# Patient Record
Sex: MALE | Race: WHITE | Employment: PART TIME | URBAN - METROPOLITAN AREA
[De-identification: names, ages, dates, MRNs, and addresses within clinical notes are randomized per-mention and may not be internally consistent; named-entity substitution may affect disease eponyms.]

---

## 2018-06-28 ENCOUNTER — APPOINTMENT (OUTPATIENT)
Dept: PHYSICAL THERAPY | Age: 73
End: 2018-06-28

## 2018-07-03 ENCOUNTER — APPOINTMENT (OUTPATIENT)
Dept: PHYSICAL THERAPY | Age: 73
End: 2018-07-03

## 2018-07-05 ENCOUNTER — APPOINTMENT (OUTPATIENT)
Dept: PHYSICAL THERAPY | Age: 73
End: 2018-07-05

## 2018-07-10 ENCOUNTER — APPOINTMENT (OUTPATIENT)
Dept: PHYSICAL THERAPY | Age: 73
End: 2018-07-10

## 2018-07-12 ENCOUNTER — APPOINTMENT (OUTPATIENT)
Dept: PHYSICAL THERAPY | Age: 73
End: 2018-07-12

## 2018-07-17 ENCOUNTER — APPOINTMENT (OUTPATIENT)
Dept: PHYSICAL THERAPY | Age: 73
End: 2018-07-17

## 2018-07-19 ENCOUNTER — APPOINTMENT (OUTPATIENT)
Dept: PHYSICAL THERAPY | Age: 73
End: 2018-07-19

## 2018-07-24 ENCOUNTER — APPOINTMENT (OUTPATIENT)
Dept: PHYSICAL THERAPY | Age: 73
End: 2018-07-24

## 2018-07-26 ENCOUNTER — APPOINTMENT (OUTPATIENT)
Dept: PHYSICAL THERAPY | Age: 73
End: 2018-07-26

## 2018-08-02 ENCOUNTER — HOSPITAL ENCOUNTER (OUTPATIENT)
Dept: LAB | Age: 73
Discharge: HOME | End: 2018-08-02
Payer: MEDICARE

## 2018-08-02 DIAGNOSIS — D72.9 NEUTROPHILIA: ICD-10-CM

## 2018-08-02 DIAGNOSIS — N42.9 DISORDER OF PROSTATE: ICD-10-CM

## 2018-08-02 DIAGNOSIS — R63.4 WEIGHT LOSS: ICD-10-CM

## 2018-08-02 DIAGNOSIS — R63.8 OTHER SYMPTOMS AND SIGNS CONCERNING FOOD AND FLUID INTAKE: ICD-10-CM

## 2018-08-02 LAB
ALBUMIN SERPL-MCNC: 4 G/DL (ref 3.2–4.6)
ALBUMIN/GLOB SERPL: 1.1 {RATIO} (ref 1.2–3.5)
ALP SERPL-CCNC: 39 U/L (ref 50–136)
ALT SERPL-CCNC: 37 U/L (ref 12–65)
ANION GAP SERPL CALC-SCNC: 9 MMOL/L (ref 7–16)
AST SERPL-CCNC: 15 U/L (ref 15–37)
BASOPHILS # BLD: 0.1 K/UL (ref 0–0.2)
BASOPHILS NFR BLD: 1 % (ref 0–2)
BILIRUB SERPL-MCNC: 0.4 MG/DL (ref 0.2–1.1)
BUN SERPL-MCNC: 10 MG/DL (ref 8–23)
CALCIUM SERPL-MCNC: 9 MG/DL (ref 8.3–10.4)
CHLORIDE SERPL-SCNC: 100 MMOL/L (ref 98–107)
CO2 SERPL-SCNC: 27 MMOL/L (ref 21–32)
CREAT SERPL-MCNC: 1.05 MG/DL (ref 0.8–1.5)
CRP SERPL-MCNC: <0.3 MG/DL (ref 0–0.9)
DIFFERENTIAL METHOD BLD: ABNORMAL
EOSINOPHIL # BLD: 0.2 K/UL (ref 0–0.8)
EOSINOPHIL NFR BLD: 1 % (ref 0.5–7.8)
ERYTHROCYTE [DISTWIDTH] IN BLOOD BY AUTOMATED COUNT: 19.1 % (ref 11.9–14.6)
ERYTHROCYTE [SEDIMENTATION RATE] IN BLOOD: 6 MM/HR (ref 0–20)
FERRITIN SERPL-MCNC: 8 NG/ML (ref 8–388)
GLOBULIN SER CALC-MCNC: 3.8 G/DL (ref 2.3–3.5)
GLUCOSE SERPL-MCNC: 128 MG/DL (ref 65–100)
HCT VFR BLD AUTO: 42.3 % (ref 41.1–50.3)
HGB BLD-MCNC: 12.4 G/DL (ref 13.6–17.2)
HGB RETIC QN AUTO: 24 PG (ref 29–35)
IMM RETICS NFR: 31 % (ref 2.3–13.4)
IRON SATN MFR SERPL: 3 %
IRON SERPL-MCNC: 15 UG/DL (ref 35–150)
LYMPHOCYTES # BLD: 2.9 K/UL (ref 0.5–4.6)
LYMPHOCYTES NFR BLD: 22 % (ref 13–44)
Lab: NORMAL
MCH RBC QN AUTO: 21.5 PG (ref 26.1–32.9)
MCHC RBC AUTO-ENTMCNC: 29.3 G/DL (ref 31.4–35)
MCV RBC AUTO: 73.3 FL (ref 79.6–97.8)
MONOCYTES # BLD: 0.8 K/UL (ref 0.1–1.3)
MONOCYTES NFR BLD: 6 % (ref 4–12)
NEUTS SEG # BLD: 9.5 K/UL (ref 1.7–8.2)
NEUTS SEG NFR BLD: 71 % (ref 43–78)
NRBC # BLD: 0.01 K/UL (ref 0–0.2)
NRBC BLD-RTO: 0.1 PER 100 WBC (ref 0–2)
PLATELET # BLD AUTO: 282 K/UL (ref 150–450)
PMV BLD AUTO: 9.3 FL (ref 10.8–14.1)
POTASSIUM SERPL-SCNC: 4.2 MMOL/L (ref 3.5–5.1)
PROT SERPL-MCNC: 7.8 G/DL (ref 6.3–8.2)
PSA SERPL-MCNC: 1 NG/ML
RBC # BLD AUTO: 5.77 M/UL (ref 4.23–5.67)
REFERENCE LAB,REFLB: NORMAL
RETICS # AUTO: 0.11 M/UL (ref 0.03–0.1)
RETICS/RBC NFR AUTO: 2 % (ref 0.3–2)
SODIUM SERPL-SCNC: 136 MMOL/L (ref 136–145)
TEST DESCRIPTION:,ATST: NORMAL
TIBC SERPL-MCNC: 437 UG/DL (ref 250–450)
WBC # BLD AUTO: 13.3 K/UL (ref 4.3–11.1)

## 2018-08-02 PROCEDURE — 88184 FLOWCYTOMETRY/ TC 1 MARKER: CPT

## 2018-08-02 PROCEDURE — 85025 COMPLETE CBC W/AUTO DIFF WBC: CPT

## 2018-08-02 PROCEDURE — 85652 RBC SED RATE AUTOMATED: CPT

## 2018-08-02 PROCEDURE — 84238 ASSAY NONENDOCRINE RECEPTOR: CPT

## 2018-08-02 PROCEDURE — 88185 FLOWCYTOMETRY/TC ADD-ON: CPT

## 2018-08-02 PROCEDURE — 83540 ASSAY OF IRON: CPT

## 2018-08-02 PROCEDURE — 86140 C-REACTIVE PROTEIN: CPT

## 2018-08-02 PROCEDURE — 82728 ASSAY OF FERRITIN: CPT

## 2018-08-02 PROCEDURE — 36415 COLL VENOUS BLD VENIPUNCTURE: CPT

## 2018-08-02 PROCEDURE — 80053 COMPREHEN METABOLIC PANEL: CPT

## 2018-08-02 PROCEDURE — 84153 ASSAY OF PSA TOTAL: CPT

## 2018-08-02 PROCEDURE — 85046 RETICYTE/HGB CONCENTRATE: CPT

## 2018-08-03 LAB — TRANSFERRIN SERPL-SCNC: 80.3 NMOL/L (ref 12.2–27.3)

## 2018-08-06 LAB — PATH REV BLD -IMP: NORMAL

## 2018-08-10 PROBLEM — D50.9 IRON DEFICIENCY ANEMIA: Status: ACTIVE | Noted: 2018-08-10

## 2018-08-10 RX ORDER — ONDANSETRON 2 MG/ML
8 INJECTION INTRAMUSCULAR; INTRAVENOUS AS NEEDED
Status: CANCELLED | OUTPATIENT
Start: 2018-08-11

## 2018-08-10 RX ORDER — EPINEPHRINE 1 MG/ML
0.3 INJECTION, SOLUTION, CONCENTRATE INTRAVENOUS AS NEEDED
Status: CANCELLED | OUTPATIENT
Start: 2018-08-11

## 2018-08-10 RX ORDER — ONDANSETRON 2 MG/ML
8 INJECTION INTRAMUSCULAR; INTRAVENOUS AS NEEDED
Status: CANCELLED | OUTPATIENT
Start: 2018-08-18

## 2018-08-10 RX ORDER — EPINEPHRINE 1 MG/ML
0.3 INJECTION, SOLUTION, CONCENTRATE INTRAVENOUS AS NEEDED
Status: CANCELLED | OUTPATIENT
Start: 2018-08-18

## 2018-08-10 RX ORDER — HYDROCORTISONE SODIUM SUCCINATE 100 MG/2ML
100 INJECTION, POWDER, FOR SOLUTION INTRAMUSCULAR; INTRAVENOUS AS NEEDED
Status: CANCELLED | OUTPATIENT
Start: 2018-08-11

## 2018-08-10 RX ORDER — HYDROCORTISONE SODIUM SUCCINATE 100 MG/2ML
100 INJECTION, POWDER, FOR SOLUTION INTRAMUSCULAR; INTRAVENOUS AS NEEDED
Status: CANCELLED | OUTPATIENT
Start: 2018-08-18

## 2018-08-10 RX ORDER — SODIUM CHLORIDE 0.9 % (FLUSH) 0.9 %
10 SYRINGE (ML) INJECTION AS NEEDED
Status: CANCELLED
Start: 2018-08-18

## 2018-08-10 RX ORDER — DIPHENHYDRAMINE HYDROCHLORIDE 50 MG/ML
50 INJECTION, SOLUTION INTRAMUSCULAR; INTRAVENOUS AS NEEDED
Status: CANCELLED
Start: 2018-08-11

## 2018-08-10 RX ORDER — DIPHENHYDRAMINE HYDROCHLORIDE 50 MG/ML
50 INJECTION, SOLUTION INTRAMUSCULAR; INTRAVENOUS AS NEEDED
Status: CANCELLED
Start: 2018-08-18

## 2018-08-10 RX ORDER — ACETAMINOPHEN 325 MG/1
650 TABLET ORAL AS NEEDED
Status: CANCELLED
Start: 2018-08-18

## 2018-08-10 RX ORDER — HEPARIN 100 UNIT/ML
300-500 SYRINGE INTRAVENOUS AS NEEDED
Status: CANCELLED
Start: 2018-08-18

## 2018-08-10 RX ORDER — SODIUM CHLORIDE 9 MG/ML
10 INJECTION INTRAMUSCULAR; INTRAVENOUS; SUBCUTANEOUS AS NEEDED
Status: CANCELLED | OUTPATIENT
Start: 2018-08-18

## 2018-08-10 RX ORDER — ACETAMINOPHEN 325 MG/1
650 TABLET ORAL AS NEEDED
Status: CANCELLED
Start: 2018-08-11

## 2018-08-10 RX ORDER — SODIUM CHLORIDE 9 MG/ML
10 INJECTION INTRAMUSCULAR; INTRAVENOUS; SUBCUTANEOUS AS NEEDED
Status: CANCELLED | OUTPATIENT
Start: 2018-08-11

## 2018-08-10 RX ORDER — ALBUTEROL SULFATE 0.83 MG/ML
2.5 SOLUTION RESPIRATORY (INHALATION) AS NEEDED
Status: CANCELLED
Start: 2018-08-18

## 2018-08-10 RX ORDER — ALBUTEROL SULFATE 0.83 MG/ML
2.5 SOLUTION RESPIRATORY (INHALATION) AS NEEDED
Status: CANCELLED
Start: 2018-08-11

## 2018-08-11 ENCOUNTER — HOSPITAL ENCOUNTER (OUTPATIENT)
Dept: INFUSION THERAPY | Age: 73
Discharge: HOME | End: 2018-08-11
Payer: MEDICARE

## 2018-08-11 VITALS
OXYGEN SATURATION: 97 % | HEART RATE: 94 BPM | TEMPERATURE: 98.6 F | DIASTOLIC BLOOD PRESSURE: 83 MMHG | RESPIRATION RATE: 20 BRPM | WEIGHT: 246 LBS | BODY MASS INDEX: 33.36 KG/M2 | SYSTOLIC BLOOD PRESSURE: 146 MMHG

## 2018-08-11 DIAGNOSIS — D50.9 IRON DEFICIENCY ANEMIA, UNSPECIFIED IRON DEFICIENCY ANEMIA TYPE: ICD-10-CM

## 2018-08-11 PROCEDURE — 74011250636 HC RX REV CODE- 250/636: Performed by: NURSE PRACTITIONER

## 2018-08-11 PROCEDURE — 96361 HYDRATE IV INFUSION ADD-ON: CPT

## 2018-08-11 PROCEDURE — 96365 THER/PROPH/DIAG IV INF INIT: CPT

## 2018-08-11 RX ORDER — SODIUM CHLORIDE 0.9 % (FLUSH) 0.9 %
10 SYRINGE (ML) INJECTION AS NEEDED
Status: ACTIVE | OUTPATIENT
Start: 2018-08-11 | End: 2018-08-12

## 2018-08-11 RX ORDER — HEPARIN 100 UNIT/ML
300-500 SYRINGE INTRAVENOUS AS NEEDED
Status: ACTIVE | OUTPATIENT
Start: 2018-08-11 | End: 2018-08-12

## 2018-08-11 RX ADMIN — SODIUM CHLORIDE 500 ML: 900 INJECTION, SOLUTION INTRAVENOUS at 13:30

## 2018-08-11 RX ADMIN — FERRIC CARBOXYMALTOSE INJECTION 750 MG: 50 INJECTION, SOLUTION INTRAVENOUS at 14:05

## 2018-08-11 NOTE — PROGRESS NOTES
Pt arrived ambulatory today at [phone], to have IV injectafer. Pt tolerated without difficulty. Patient discharged via ambulatory accompanied by ambulatory. Instructed to notify physician of any problems, questions or concerns. Allowed opportunity for patient/family to ask questions. Verbalized understanding. Next appointment is August 18 at 1300 with [facility].

## 2018-08-11 NOTE — PROGRESS NOTES
Problem: Knowledge Deficit  Goal: *Verbalizes understanding of procedures and medications  Outcome: Progressing Towards Goal  Verbalizes/demonstrates understanding of purpose/procedure/potentia side effects of injectafer.

## 2018-08-16 ENCOUNTER — APPOINTMENT (OUTPATIENT)
Dept: INFUSION THERAPY | Age: 73
End: 2018-08-16

## 2018-08-18 ENCOUNTER — HOSPITAL ENCOUNTER (OUTPATIENT)
Dept: INFUSION THERAPY | Age: 73
Discharge: HOME | End: 2018-08-18
Payer: MEDICARE

## 2018-08-18 VITALS
OXYGEN SATURATION: 96 % | WEIGHT: 246 LBS | DIASTOLIC BLOOD PRESSURE: 81 MMHG | TEMPERATURE: 97.9 F | HEART RATE: 88 BPM | RESPIRATION RATE: 20 BRPM | BODY MASS INDEX: 33.36 KG/M2 | SYSTOLIC BLOOD PRESSURE: 158 MMHG

## 2018-08-18 DIAGNOSIS — D50.9 IRON DEFICIENCY ANEMIA, UNSPECIFIED IRON DEFICIENCY ANEMIA TYPE: ICD-10-CM

## 2018-08-18 PROCEDURE — 74011250636 HC RX REV CODE- 250/636: Performed by: NURSE PRACTITIONER

## 2018-08-18 PROCEDURE — 96365 THER/PROPH/DIAG IV INF INIT: CPT

## 2018-08-18 PROCEDURE — 96361 HYDRATE IV INFUSION ADD-ON: CPT

## 2018-08-18 RX ORDER — SODIUM CHLORIDE 0.9 % (FLUSH) 0.9 %
10 SYRINGE (ML) INJECTION AS NEEDED
Status: ACTIVE | OUTPATIENT
Start: 2018-08-18 | End: 2018-08-19

## 2018-08-18 RX ADMIN — SODIUM CHLORIDE 500 ML: 900 INJECTION, SOLUTION INTRAVENOUS at 13:20

## 2018-08-18 RX ADMIN — FERRIC CARBOXYMALTOSE INJECTION 750 MG: 50 INJECTION, SOLUTION INTRAVENOUS at 13:34

## 2018-08-18 NOTE — PROGRESS NOTES
Pt arrived ambulatory today at [phone], to have IV injectafer. Pt tolerated without difficulty. Patient discharged via ambulatory accompanied by ambulatory. Instructed to notify physician of any problems, questions or concerns. Allowed opportunity for patient/family to ask questions. Verbalized understanding. Next appointment is September 6 at [address] with Dr. Blanca Mcdonough.

## 2018-08-23 ENCOUNTER — APPOINTMENT (OUTPATIENT)
Dept: INFUSION THERAPY | Age: 73
End: 2018-08-23

## 2018-08-30 PROBLEM — R68.81 EARLY SATIETY: Status: ACTIVE | Noted: 2018-08-30

## 2018-09-06 ENCOUNTER — HOSPITAL ENCOUNTER (OUTPATIENT)
Dept: LAB | Age: 73
Discharge: HOME | End: 2018-09-06
Payer: MEDICARE

## 2018-09-06 DIAGNOSIS — D50.9 IRON DEFICIENCY ANEMIA, UNSPECIFIED IRON DEFICIENCY ANEMIA TYPE: ICD-10-CM

## 2018-09-06 LAB
ALBUMIN SERPL-MCNC: 4 G/DL (ref 3.2–4.6)
ALBUMIN/GLOB SERPL: 1 {RATIO} (ref 1.2–3.5)
ALP SERPL-CCNC: 44 U/L (ref 50–136)
ALT SERPL-CCNC: 33 U/L (ref 12–65)
ANION GAP SERPL CALC-SCNC: 6 MMOL/L (ref 7–16)
AST SERPL-CCNC: 21 U/L (ref 15–37)
BASOPHILS # BLD: 0.1 K/UL (ref 0–0.2)
BASOPHILS NFR BLD: 1 % (ref 0–2)
BILIRUB SERPL-MCNC: 0.3 MG/DL (ref 0.2–1.1)
BUN SERPL-MCNC: 9 MG/DL (ref 8–23)
CALCIUM SERPL-MCNC: 8.6 MG/DL (ref 8.3–10.4)
CHLORIDE SERPL-SCNC: 104 MMOL/L (ref 98–107)
CO2 SERPL-SCNC: 28 MMOL/L (ref 21–32)
CREAT SERPL-MCNC: 1.06 MG/DL (ref 0.8–1.5)
DIFFERENTIAL METHOD BLD: ABNORMAL
EOSINOPHIL # BLD: 0.2 K/UL (ref 0–0.8)
EOSINOPHIL NFR BLD: 2 % (ref 0.5–7.8)
ERYTHROCYTE [DISTWIDTH] IN BLOOD BY AUTOMATED COUNT: 27.1 % (ref 11.9–14.6)
FERRITIN SERPL-MCNC: 122 NG/ML (ref 8–388)
GLOBULIN SER CALC-MCNC: 3.9 G/DL (ref 2.3–3.5)
GLUCOSE SERPL-MCNC: 162 MG/DL (ref 65–100)
HCT VFR BLD AUTO: 52.2 % (ref 41.1–50.3)
HGB BLD-MCNC: 15.2 G/DL (ref 13.6–17.2)
HGB RETIC QN AUTO: 31 PG (ref 29–35)
IMM GRANULOCYTES # BLD: 0.1 K/UL (ref 0–0.5)
IMM GRANULOCYTES NFR BLD AUTO: 1 % (ref 0–5)
IMM RETICS NFR: 27.5 % (ref 2.3–13.4)
IRON SATN MFR SERPL: 9 %
IRON SERPL-MCNC: 33 UG/DL (ref 35–150)
LYMPHOCYTES # BLD: 2.6 K/UL (ref 0.5–4.6)
LYMPHOCYTES NFR BLD: 28 % (ref 13–44)
MCH RBC QN AUTO: 24.6 PG (ref 26.1–32.9)
MCHC RBC AUTO-ENTMCNC: 29.1 G/DL (ref 31.4–35)
MCV RBC AUTO: 84.3 FL (ref 79.6–97.8)
MONOCYTES # BLD: 0.7 K/UL (ref 0.1–1.3)
MONOCYTES NFR BLD: 8 % (ref 4–12)
NEUTS SEG # BLD: 5.6 K/UL (ref 1.7–8.2)
NEUTS SEG NFR BLD: 61 % (ref 43–78)
NRBC # BLD: 0 K/UL (ref 0–0.2)
PLATELET # BLD AUTO: 251 K/UL (ref 150–450)
PMV BLD AUTO: 8.7 FL (ref 9.4–12.3)
POTASSIUM SERPL-SCNC: 4.9 MMOL/L (ref 3.5–5.1)
PROT SERPL-MCNC: 7.9 G/DL (ref 6.3–8.2)
RBC # BLD AUTO: 6.19 M/UL (ref 4.23–5.67)
RETICS # AUTO: 0.16 M/UL (ref 0.03–0.1)
RETICS/RBC NFR AUTO: 2.5 % (ref 0.3–2)
SODIUM SERPL-SCNC: 138 MMOL/L (ref 136–145)
TIBC SERPL-MCNC: 366 UG/DL (ref 250–450)
WBC # BLD AUTO: 9.2 K/UL (ref 4.3–11.1)

## 2018-09-06 PROCEDURE — 85025 COMPLETE CBC W/AUTO DIFF WBC: CPT

## 2018-09-06 PROCEDURE — 85046 RETICYTE/HGB CONCENTRATE: CPT

## 2018-09-06 PROCEDURE — 36415 COLL VENOUS BLD VENIPUNCTURE: CPT

## 2018-09-06 PROCEDURE — 83540 ASSAY OF IRON: CPT

## 2018-09-06 PROCEDURE — 82728 ASSAY OF FERRITIN: CPT

## 2018-09-06 PROCEDURE — 80053 COMPREHEN METABOLIC PANEL: CPT

## 2018-09-08 ENCOUNTER — HOSPITAL ENCOUNTER (OUTPATIENT)
Dept: INFUSION THERAPY | Age: 73
Discharge: HOME | End: 2018-09-08
Payer: MEDICARE

## 2018-09-08 VITALS
WEIGHT: 245.99 LBS | OXYGEN SATURATION: 97 % | SYSTOLIC BLOOD PRESSURE: 135 MMHG | TEMPERATURE: 97.9 F | DIASTOLIC BLOOD PRESSURE: 70 MMHG | BODY MASS INDEX: 33.36 KG/M2 | HEART RATE: 89 BPM | RESPIRATION RATE: 20 BRPM

## 2018-09-08 DIAGNOSIS — D50.9 IRON DEFICIENCY ANEMIA, UNSPECIFIED IRON DEFICIENCY ANEMIA TYPE: ICD-10-CM

## 2018-09-08 PROCEDURE — 96365 THER/PROPH/DIAG IV INF INIT: CPT

## 2018-09-08 PROCEDURE — 74011250636 HC RX REV CODE- 250/636: Performed by: INTERNAL MEDICINE

## 2018-09-08 PROCEDURE — 96361 HYDRATE IV INFUSION ADD-ON: CPT

## 2018-09-08 RX ORDER — SODIUM CHLORIDE 0.9 % (FLUSH) 0.9 %
10 SYRINGE (ML) INJECTION AS NEEDED
Status: ACTIVE | OUTPATIENT
Start: 2018-09-08 | End: 2018-09-08

## 2018-09-08 RX ORDER — HEPARIN 100 UNIT/ML
300-500 SYRINGE INTRAVENOUS AS NEEDED
Status: ACTIVE | OUTPATIENT
Start: 2018-09-08 | End: 2018-09-08

## 2018-09-08 RX ADMIN — SODIUM CHLORIDE 500 ML: 900 INJECTION, SOLUTION INTRAVENOUS at 10:55

## 2018-09-08 RX ADMIN — FERRIC CARBOXYMALTOSE INJECTION 750 MG: 50 INJECTION, SOLUTION INTRAVENOUS at 11:04

## 2018-09-08 NOTE — PROGRESS NOTES
Pt arrived ambulatory today at [phone], to have IV injectafer. Pt tolerated without difficulty. Patient discharged via ambulatory accompanied by ambulatory. Instructed to notify physician of any problems, questions or concerns. Allowed opportunity for patient/family to ask questions. Verbalized understanding. Next appointment is Sammy 10 at . Jayda Esparza [address] with Dr. Gabriele Arnold.

## 2018-09-25 ENCOUNTER — ANESTHESIA EVENT (OUTPATIENT)
Dept: ENDOSCOPY | Age: 73
End: 2018-09-25
Payer: MEDICARE

## 2018-09-25 ENCOUNTER — ANESTHESIA (OUTPATIENT)
Dept: ENDOSCOPY | Age: 73
End: 2018-09-25
Payer: MEDICARE

## 2018-09-25 ENCOUNTER — HOSPITAL ENCOUNTER (OUTPATIENT)
Age: 73
Setting detail: OUTPATIENT SURGERY
Discharge: HOME | End: 2018-09-25
Attending: SURGERY | Admitting: SURGERY
Payer: MEDICARE

## 2018-09-25 VITALS
BODY MASS INDEX: 33.86 KG/M2 | RESPIRATION RATE: 18 BRPM | SYSTOLIC BLOOD PRESSURE: 170 MMHG | OXYGEN SATURATION: 95 % | WEIGHT: 250 LBS | HEART RATE: 86 BPM | HEIGHT: 72 IN | TEMPERATURE: 98.6 F | DIASTOLIC BLOOD PRESSURE: 88 MMHG

## 2018-09-25 LAB — GLUCOSE BLD STRIP.AUTO-MCNC: 169 MG/DL (ref 65–100)

## 2018-09-25 PROCEDURE — 82962 GLUCOSE BLOOD TEST: CPT

## 2018-09-25 PROCEDURE — 76040000026: Performed by: SURGERY

## 2018-09-25 PROCEDURE — 76060000032 HC ANESTHESIA 0.5 TO 1 HR: Performed by: SURGERY

## 2018-09-25 PROCEDURE — 77030009426 HC FCPS BIOP ENDOSC BSC -B: Performed by: SURGERY

## 2018-09-25 PROCEDURE — 74011250636 HC RX REV CODE- 250/636

## 2018-09-25 PROCEDURE — 74011250636 HC RX REV CODE- 250/636: Performed by: ANESTHESIOLOGY

## 2018-09-25 PROCEDURE — 88305 TISSUE EXAM BY PATHOLOGIST: CPT

## 2018-09-25 RX ORDER — SODIUM CHLORIDE 0.9 % (FLUSH) 0.9 %
5-10 SYRINGE (ML) INJECTION AS NEEDED
Status: CANCELLED | OUTPATIENT
Start: 2018-09-25

## 2018-09-25 RX ORDER — SODIUM CHLORIDE, SODIUM LACTATE, POTASSIUM CHLORIDE, CALCIUM CHLORIDE 600; 310; 30; 20 MG/100ML; MG/100ML; MG/100ML; MG/100ML
100 INJECTION, SOLUTION INTRAVENOUS CONTINUOUS
Status: DISCONTINUED | OUTPATIENT
Start: 2018-09-25 | End: 2018-09-25 | Stop reason: HOSPADM

## 2018-09-25 RX ORDER — LIDOCAINE HYDROCHLORIDE 20 MG/ML
INJECTION, SOLUTION EPIDURAL; INFILTRATION; INTRACAUDAL; PERINEURAL AS NEEDED
Status: DISCONTINUED | OUTPATIENT
Start: 2018-09-25 | End: 2018-09-25 | Stop reason: HOSPADM

## 2018-09-25 RX ORDER — PROPOFOL 10 MG/ML
INJECTION, EMULSION INTRAVENOUS
Status: DISCONTINUED | OUTPATIENT
Start: 2018-09-25 | End: 2018-09-25 | Stop reason: HOSPADM

## 2018-09-25 RX ORDER — SODIUM CHLORIDE, SODIUM LACTATE, POTASSIUM CHLORIDE, CALCIUM CHLORIDE 600; 310; 30; 20 MG/100ML; MG/100ML; MG/100ML; MG/100ML
100 INJECTION, SOLUTION INTRAVENOUS CONTINUOUS
Status: CANCELLED | OUTPATIENT
Start: 2018-09-25

## 2018-09-25 RX ORDER — PROPOFOL 10 MG/ML
INJECTION, EMULSION INTRAVENOUS AS NEEDED
Status: DISCONTINUED | OUTPATIENT
Start: 2018-09-25 | End: 2018-09-25 | Stop reason: HOSPADM

## 2018-09-25 RX ADMIN — PROPOFOL 250 MCG/KG/MIN: 10 INJECTION, EMULSION INTRAVENOUS at 07:00

## 2018-09-25 RX ADMIN — LIDOCAINE HYDROCHLORIDE 100 MG: 20 INJECTION, SOLUTION EPIDURAL; INFILTRATION; INTRACAUDAL; PERINEURAL at 07:00

## 2018-09-25 RX ADMIN — SODIUM CHLORIDE, SODIUM LACTATE, POTASSIUM CHLORIDE, AND CALCIUM CHLORIDE 100 ML/HR: 600; 310; 30; 20 INJECTION, SOLUTION INTRAVENOUS at 06:50

## 2018-09-25 RX ADMIN — PROPOFOL 50 MG: 10 INJECTION, EMULSION INTRAVENOUS at 07:00

## 2018-09-25 NOTE — ANESTHESIA POSTPROCEDURE EVALUATION
Post-Anesthesia Evaluation and Assessment Patient: Gigi Contreras MRN: [id number]  SSN: [id number] YOB: 1945  Age: 75 y.o. Sex: male Cardiovascular Function/Vital Signs Visit Vitals  /75  Pulse 86  Temp 37 °C (98.6 °F)  Resp 18  Ht 6' (1.829 m)  Wt 113.4 kg (250 lb)  SpO2 90%  BMI 33.91 kg/m2 Patient is status post total IV anesthesia anesthesia for Procedure(s): ESOPHAGOGASTRODUODENOSCOPY (EGD) COLONOSCOPY  BMI 34 ENDOSCOPIC POLYPECTOMY. Nausea/Vomiting: None Postoperative hydration reviewed and adequate. Pain: 
Pain Scale 1: Numeric (0 - 10) (09/25/18 0745) Pain Intensity 1: 0 (09/25/18 0745) Managed Neurological Status: At baseline Mental Status and Level of Consciousness: Arousable Pulmonary Status:  
O2 Device: Nasal cannula (09/25/18 0745) Adequate oxygenation and airway patent Complications related to anesthesia: None Post-anesthesia assessment completed. No concerns Signed By: Janine Oh MD   
 September 25, 2018

## 2018-09-25 NOTE — DISCHARGE INSTRUCTIONS
Gastrointestinal Esophagogastroduodenoscopy (EGD) - Upper Exam Discharge Instructions    1. Call Dr. Shruti Ochoa for any problems or questions. 2. Contact the doctor's office for follow up appointment as directed. 3. Medication may cause drowsiness for several hours, therefore, do not drive or operate machinery for remainder of the day. 4. No alcohol today. 5. Ordinarily, you may resume regular diet and activity after exam unless otherwise specified by your physician. 6. For mild soreness in your throat you may use Cepacol throat lozenges or warm salt-water gargles as needed. Instructions given to [facility] and other family members. Gastrointestinal Colonoscopy/Flexible Sigmoidoscopy - Lower Exam Discharge Instructions  1. Call Dr. Shruti Ochoa for any problems or questions. 2. Contact the doctors office for follow up appointment as directed  3. Medication may cause drowsiness for several hours, therefore, do not drive or operate machinery for remainder of the day. 4. No alcohol today. 5. Ordinarily, you may resume regular diet and activity after exam unless otherwise specified by your physician. 6. Because of air put into your colon during exam, you may experience some abdominal distension, relieved by the passage of gas, for several hours. 7. Contact your physician if you have any of the following:  a. Excessive amount of bleeding - large amount when having a bowel movement. Occasional specks of blood with bowel movement would not be unusual.  b. Severe abdominal pain  c. Fever or Chills  8. Polyp Removal - follow these additional instructions  a. No Aspirin, Advil, Aleve, Nuprin, Ibuprofen, or medications that contain these drugs for 2 weeks. Any additional instructions: Follow up with pathology      Instructions given to [facility] and other family members.

## 2018-09-25 NOTE — IP AVS SNAPSHOT
[address] 
[phone] Patient: Lo Oh MRN: [id number] AHB:[birth year] About your hospitalization You were admitted on:  September 25, 2018 You last received care in the:  [facility] You were discharged on:  September 25, 2018 Why you were hospitalized Your primary diagnosis was:  Not on File Follow-up Information None Discharge Orders None A check leonardo indicates which time of day the medication should be taken. My Medications ASK your doctor about these medications Instructions Each Dose to Equal  
 Morning Noon Evening Bedtime  
 amLODIPine 5 mg tablet Commonly known as:  Zaina Quintanilla Your last dose was: Your next dose is: Take 5 mg by mouth every morning. 5 mg  
    
   
   
   
  
 aspirin delayed-release 81 mg tablet Your last dose was: Your next dose is: Take 81 mg by mouth daily. Not currently taking-9-23-18  
 81 mg  
    
   
   
   
  
 b complex vitamins tablet Your last dose was: Your next dose is: Take 1 Tab by mouth daily. 1 Tab  
    
   
   
   
  
 benazepril 5 mg tablet Commonly known as:  LOTENSIN Your last dose was: Your next dose is: Take 5 mg by mouth daily. 5 mg CRESTOR 10 mg tablet Generic drug:  rosuvastatin Your last dose was: Your next dose is: Take 10 mg by mouth nightly. 10 mg  
    
   
   
   
  
 fenofibrate 160 mg tablet Commonly known as:  LOFIBRA Your last dose was: Your next dose is: Take 160 mg by mouth nightly. 160 mg  
    
   
   
   
  
 irbesartan 300 mg tablet Commonly known as:  AVAPRO Your last dose was: Your next dose is: Take 300 mg by mouth every morning.   
 300 mg  
    
   
   
   
  
 metFORMIN 500 mg tablet Commonly known as:  GLUCOPHAGE Your last dose was: Your next dose is: Take 500 mg by mouth nightly. 500 mg MULTIVITAMIN PO Your last dose was: Your next dose is: Take 1 Tab by mouth daily. 1 Tab  
    
   
   
   
  
 testosterone cypionate 200 mg/mL injection Commonly known as:  DEPOTESTOTERONE CYPIONATE Your last dose was: Your next dose is:    
   
   
 200 mg by IntraMUSCular route every fourteen (14) days. 200 mg  
    
   
   
   
  
 VITAMIN D3 2,000 unit Tab Generic drug:  cholecalciferol (vitamin D3) Your last dose was: Your next dose is: Take 1 Tab by mouth two (2) times a day. 1 Tab Discharge Instructions Gastrointestinal Esophagogastroduodenoscopy (EGD) - Upper Exam Discharge Instructions 1. Call Dr. Elsie White for any problems or questions. 2. Contact the doctor's office for follow up appointment as directed. 3. Medication may cause drowsiness for several hours, therefore, do not drive or operate machinery for remainder of the day. 4. No alcohol today. 5. Ordinarily, you may resume regular diet and activity after exam unless otherwise specified by your physician. 6. For mild soreness in your throat you may use Cepacol throat lozenges or warm salt-water gargles as needed. Instructions given to Carmita Cornejo and other family members. Gastrointestinal Colonoscopy/Flexible Sigmoidoscopy - Lower Exam Discharge Instructions 1. Call Dr. Elsie White for any problems or questions. 2. Contact the doctors office for follow up appointment as directed 3. Medication may cause drowsiness for several hours, therefore, do not drive or operate machinery for remainder of the day. 4. No alcohol today. 5. Ordinarily, you may resume regular diet and activity after exam unless otherwise specified by your physician. 6. Because of air put into your colon during exam, you may experience some abdominal distension, relieved by the passage of gas, for several hours. 7. Contact your physician if you have any of the following: 
a. Excessive amount of bleeding  large amount when having a bowel movement. Occasional specks of blood with bowel movement would not be unusual. 
b. Severe abdominal pain 
c. Fever or Chills 8. Polyp Removal  follow these additional instructions 
a. No Aspirin, Advil, Aleve, Nuprin, Ibuprofen, or medications that contain these drugs for 2 weeks. Any additional instructions: Follow up with pathology Instructions given to Margaux Khanna and other family members. Introducing [facility] HE[facility]! [employer] introduces Savorfull patient portal. Now you can access parts of your medical record, email your doctor's office, and request medication refills online. 1. In your internet browser, go to [email or web address] 2. Click on the First Time User? Click Here link in the Sign In box. You will see the New Member Sign Up page. 3. Enter your Savorfull Access Code exactly as it appears below. You will not need to use this code after youve completed the sign-up process. If you do not sign up before the expiration date, you must request a new code. · Savorfull Access Code: [email or web address] Expires: 10/15/2018  3:57 PM 
 
4. Enter the last four digits of your Social Security Number (xxxx) and Date of Birth (mm/dd/yyyy) as indicated and click Submit. You will be taken to the next sign-up page. 5. Create a Obsorbt ID. This will be your Savorfull login ID and cannot be changed, so think of one that is secure and easy to remember. 6. Create a Savorfull password. You can change your password at any time. 7. Enter your Password Reset Question and Answer. This can be used at a later time if you forget your password. 8. Enter your e-mail address. You will receive e-mail notification when new information is available in [address] 9. Click Sign Up. You can now view and download portions of your medical record. 10. Click the Download Summary menu link to download a portable copy of your medical information. If you have questions, please visit the Frequently Asked Questions section of the RingCentral[employer] website. Remember, [employer] is NOT to be used for urgent needs. For medical emergencies, dial 911. Now available from your iPhone and Android! Introducing Dennis Bob As a [employer] patient, I wanted to make you aware of our electronic visit tool called Dennis Bob. [employer] 24/7 allows you to connect within minutes with a medical provider 24 hours a day, seven days a week via a mobile device or tablet or logging into a secure website from your computer. You can access Dennis Bob from anywhere in the [place]. A virtual visit might be right for you when you have a simple condition and feel like you just dont want to get out of bed, or cant get away from work for an appointment, when your regular [employer] provider is not available (evenings, weekends or holidays), or when youre out of town and need minor care. Electronic visits cost only $49 and if the [employer] 24/7 provider determines a prescription is needed to treat your condition, one can be electronically transmitted to a nearby pharmacy*. Please take a moment to enroll today if you have not already done so. The enrollment process is free and takes just a few minutes. To enroll, please download the [employer] 24/7 caesar to your tablet or phone, or visit [email or web address] to enroll on your computer.    
And, as an [address] [place] [address] patient with a [employer] account, the results of your visits will be scanned into your electronic medical record and your primary care provider will be able to view the scanned results. We urge you to continue to see your regular Josephine Bustos provider for your ongoing medical care. And while your primary care provider may not be the one available when you seek a Dennis Bob virtual visit, the peace of mind you get from getting a real diagnosis real time can be priceless. For more information on Dennis Ezradavid, view our Frequently Asked Questions (FAQs) at [email or web address]. Sincerely, 
 
James Acosta MD 
Chief Medical Officer [address] *:  certain medications cannot be prescribed via Dennis Bob Providers Seen During Your Hospitalization Provider Specialty Primary office phone Gabby Barnes MD General Surgery [phone] Your Primary Care Physician (PCP) Primary Care Physician Office Phone Office Fax Skyler Chan [address]at[address] [phone] You are allergic to the following No active allergies Recent Documentation Height Weight BMI Smoking Status 1.829 m 113.4 kg 33.91 kg/m2 Current Some Day Smoker Emergency Contacts Name Discharge Info Relation Home Work Mobile [facility] CAREGIVER [3] Spouse [3] [phone] Patient Belongings The following personal items are in your possession at time of discharge: 
  Dental Appliances: Lowers, Uppers  Visual Aid: Glasses Please provide this summary of care documentation to your next provider. Signatures-by signing, you are acknowledging that this After Visit Summary has been reviewed with you and you have received a copy. Patient Signature:  ____________________________________________________________ Date:  ____________________________________________________________  
  
Celester Rota Provider Signature:  ____________________________________________________________ Date:  ____________________________________________________________

## 2018-09-25 NOTE — ANESTHESIA PREPROCEDURE EVALUATION
Anesthetic History Review of Systems / Medical History Patient summary reviewed, nursing notes reviewed and pertinent labs reviewed Pulmonary COPD: moderate Sleep apnea: CPAP Smoker Neuro/Psych  
 
 
 
TIA and psychiatric history (Claustrophobia) Cardiovascular Hypertension Exercise tolerance: <4 METS: Limited by obesity, emphysema GI/Hepatic/Renal 
  
 
 
Renal disease: stones Endo/Other Diabetes: poorly controlled, type 2 Obesity and arthritis Other Findings Physical Exam 
 
Airway Mallampati: II 
TM Distance: > 6 cm Neck ROM: decreased range of motion Mouth opening: Normal 
 
 Cardiovascular Regular rate and rhythm,  S1 and S2 normal,  no murmur, click, rub, or gallop Dental 
 
Dentition: Full upper dentures and Full lower dentures Pulmonary Decreased breath sounds: bilateral 
 
 
 
 
 Abdominal 
GI exam deferred Other Findings Anesthetic Plan ASA: 3 Anesthesia type: total IV anesthesia Anesthetic plan and risks discussed with: Patient

## 2018-10-11 NOTE — OP NOTES
[facility] REPORT    Courtney Conrad  MR#: [id number]  : 1945  ACCOUNT #: [de-identified]   DATE OF SERVICE: 2018    PREOPERATIVE DIAGNOSIS:  Neutrophilia and neutrophilic anemia. POSTOPERATIVE DIAGNOSES:  Neutrophilia and neutrophilic anemia; sigmoid diverticulosis and polyp at splenic flexure. PROCEDURE PERFORMED:  Colonoscopy and biopsy of polyp at splenic flexure. SURGEON:  Cristy Hankins MD    ANESTHESIA:  MAC    ASSISTANT:  None. ESTIMATED BLOOD LOSS:  Minimal.    SPECIMENS REMOVED:  Biopsy of polyp at splenic flexure. IMPLANTS:  None. COMPLICATIONS:  none    DESCRIPTION OF PROCEDURE:  After the patient was rolled on the left side and sedated inspection of the anus was carried out and it was normal.  General introduction of the scope was then done and under direct visualization, it was passed to the level of the cecum. Slow withdrawal of over 8 minutes was then performed with findings of a normal cecum, ascending colon, splenic flexure, transverse colon. There was a very small hyperplastic polyp seen in the splenic flexure and this was biopsied. Excellent specimen was obtained. There was minimal bleeding. Scope was then continued withdrawn with finding of sigmoid diverticulosis. The rectum was normal.  Scope was removed. Patient tolerated the procedure well. MD JL West / Sherif Keane  D: 10/11/2018 10:12     T: 10/11/2018 12:15  JOB #: [id number]

## 2018-10-11 NOTE — OP NOTES
[facility] REPORT    Freida Francis  MR#: [id number]  : 1945  ACCOUNT #: [de-identified]   DATE OF SERVICE: 2018    PREOPERATIVE DIAGNOSIS:  Neutrophilic anemia. POSTOPERATIVE DIAGNOSES:  Neutrophilic anemia. Normal esophagogastroduodenoscopy. PROCEDURE PERFORMED:  Esophagogastroduodenoscopy. SURGEON:  Lety Saleh MD    ANESTHESIA:  MAC. ASSISTANT:  None. IMPLANTS:  None. SPECIMENS REMOVED:  None. ESTIMATED BLOOD LOSS:  None. COMPLICATIONS:  none    DESCRIPTION OF PROCEDURE:  After the patient was rolled on the left side and sedation was performed, scope was lubricated and gently inserted into the oropharynx. This was then negotiated into the esophagus very easily and traversed under direct visualization into the stomach. Insufflation was required. Pylorus was normal.  I did pass the pylorus and looked into the duodenum without abnormality. Slow withdrawal was then done into the stomach. Inspection of the stomach revealed no abnormalities. Retroflexion view of the hiatus was done with the finding of no abnormalities. Scope was slowly withdrawn through the esophagus, which was normal.  Scope was removed. Patient tolerated the procedure well. MD JL Sandhu /   D: 10/11/2018 10:14     T: 10/11/2018 12:16  JOB #: [id number]

## 2019-01-10 ENCOUNTER — HOSPITAL ENCOUNTER (OUTPATIENT)
Dept: LAB | Age: 74
Discharge: HOME | End: 2019-01-10
Payer: MEDICARE

## 2019-01-10 DIAGNOSIS — D72.9 NEUTROPHILIA: ICD-10-CM

## 2019-01-10 DIAGNOSIS — D72.829 LEUKOCYTOSIS, UNSPECIFIED TYPE: ICD-10-CM

## 2019-01-10 DIAGNOSIS — D50.9 IRON DEFICIENCY ANEMIA, UNSPECIFIED IRON DEFICIENCY ANEMIA TYPE: ICD-10-CM

## 2019-01-10 PROBLEM — E66.01 SEVERE OBESITY (HCC): Status: ACTIVE | Noted: 2019-01-10

## 2019-01-10 LAB
ALBUMIN SERPL-MCNC: 3.9 G/DL (ref 3.2–4.6)
ALBUMIN/GLOB SERPL: 1 {RATIO} (ref 1.2–3.5)
ALP SERPL-CCNC: 51 U/L (ref 50–136)
ALT SERPL-CCNC: 29 U/L (ref 12–65)
ANION GAP SERPL CALC-SCNC: 6 MMOL/L (ref 7–16)
AST SERPL-CCNC: 12 U/L (ref 15–37)
BASOPHILS # BLD: 0.1 K/UL (ref 0–0.2)
BASOPHILS NFR BLD: 1 % (ref 0–2)
BILIRUB SERPL-MCNC: 0.6 MG/DL (ref 0.2–1.1)
BUN SERPL-MCNC: 10 MG/DL (ref 8–23)
CALCIUM SERPL-MCNC: 8.8 MG/DL (ref 8.3–10.4)
CHLORIDE SERPL-SCNC: 99 MMOL/L (ref 98–107)
CO2 SERPL-SCNC: 30 MMOL/L (ref 21–32)
CREAT SERPL-MCNC: 1.19 MG/DL (ref 0.8–1.5)
DIFFERENTIAL METHOD BLD: ABNORMAL
EOSINOPHIL # BLD: 0.2 K/UL (ref 0–0.8)
EOSINOPHIL NFR BLD: 2 % (ref 0.5–7.8)
ERYTHROCYTE [DISTWIDTH] IN BLOOD BY AUTOMATED COUNT: 18.1 % (ref 11.9–14.6)
FERRITIN SERPL-MCNC: 37 NG/ML (ref 8–388)
GLOBULIN SER CALC-MCNC: 4 G/DL (ref 2.3–3.5)
GLUCOSE SERPL-MCNC: 219 MG/DL (ref 65–100)
HCT VFR BLD AUTO: 54.5 % (ref 41.1–50.3)
HGB BLD-MCNC: 17.5 G/DL (ref 13.6–17.2)
HGB RETIC QN AUTO: 36 PG (ref 29–35)
IMM GRANULOCYTES # BLD AUTO: 0.1 K/UL (ref 0–0.5)
IMM GRANULOCYTES NFR BLD AUTO: 1 % (ref 0–5)
IMM RETICS NFR: 23.6 % (ref 2.3–13.4)
IRON SATN MFR SERPL: 31 %
IRON SERPL-MCNC: 119 UG/DL (ref 35–150)
LYMPHOCYTES # BLD: 2.7 K/UL (ref 0.5–4.6)
LYMPHOCYTES NFR BLD: 26 % (ref 13–44)
MCH RBC QN AUTO: 29.8 PG (ref 26.1–32.9)
MCHC RBC AUTO-ENTMCNC: 32.1 G/DL (ref 31.4–35)
MCV RBC AUTO: 92.8 FL (ref 79.6–97.8)
MONOCYTES # BLD: 0.6 K/UL (ref 0.1–1.3)
MONOCYTES NFR BLD: 6 % (ref 4–12)
NEUTS SEG # BLD: 6.6 K/UL (ref 1.7–8.2)
NEUTS SEG NFR BLD: 64 % (ref 43–78)
NRBC # BLD: 0 K/UL (ref 0–0.2)
PLATELET # BLD AUTO: 217 K/UL (ref 150–450)
PMV BLD AUTO: 8.6 FL (ref 9.4–12.3)
POTASSIUM SERPL-SCNC: 4.3 MMOL/L (ref 3.5–5.1)
PROT SERPL-MCNC: 7.9 G/DL (ref 6.3–8.2)
RBC # BLD AUTO: 5.87 M/UL (ref 4.23–5.67)
RETICS # AUTO: 0.15 M/UL (ref 0.03–0.1)
RETICS/RBC NFR AUTO: 2.6 % (ref 0.3–2)
SODIUM SERPL-SCNC: 135 MMOL/L (ref 136–145)
TIBC SERPL-MCNC: 382 UG/DL (ref 250–450)
WBC # BLD AUTO: 10.3 K/UL (ref 4.3–11.1)

## 2019-01-10 PROCEDURE — 36415 COLL VENOUS BLD VENIPUNCTURE: CPT

## 2019-01-10 PROCEDURE — 82728 ASSAY OF FERRITIN: CPT

## 2019-01-10 PROCEDURE — 85046 RETICYTE/HGB CONCENTRATE: CPT

## 2019-01-10 PROCEDURE — 83540 ASSAY OF IRON: CPT

## 2019-01-10 PROCEDURE — 85025 COMPLETE CBC W/AUTO DIFF WBC: CPT

## 2019-01-10 PROCEDURE — 80053 COMPREHEN METABOLIC PANEL: CPT

## 2019-01-19 ENCOUNTER — HOSPITAL ENCOUNTER (OUTPATIENT)
Dept: INFUSION THERAPY | Age: 74
Discharge: HOME | End: 2019-01-19
Payer: MEDICARE

## 2019-01-19 VITALS — TEMPERATURE: 98 F | SYSTOLIC BLOOD PRESSURE: 149 MMHG | HEART RATE: 88 BPM | DIASTOLIC BLOOD PRESSURE: 81 MMHG

## 2019-01-19 DIAGNOSIS — D50.9 IRON DEFICIENCY ANEMIA, UNSPECIFIED IRON DEFICIENCY ANEMIA TYPE: Primary | ICD-10-CM

## 2019-01-19 PROCEDURE — 74011250636 HC RX REV CODE- 250/636: Performed by: INTERNAL MEDICINE

## 2019-01-19 PROCEDURE — 96365 THER/PROPH/DIAG IV INF INIT: CPT

## 2019-01-19 RX ADMIN — FERRIC CARBOXYMALTOSE INJECTION 750 MG: 50 INJECTION, SOLUTION INTRAVENOUS at 10:05

## 2019-01-19 RX ADMIN — SODIUM CHLORIDE 500 ML: 900 INJECTION, SOLUTION INTRAVENOUS at 09:51

## 2019-01-19 NOTE — PROGRESS NOTES
Arrived to infusion. Labs and [employer] completed. Patient tolerated well. Denies new issues or concerns. Next appointment is 1/26/19 at [address] [place] [address].   Discharged ambulatory with wife.

## 2019-01-25 LAB
BACKGROUND, BCR6T: NORMAL
INTERPRETATION: 480488: NORMAL
LAB DIRECTOR NAME PROVIDER: NORMAL
T(ABL1,BCR)B2A2/CONTROL BLD/T: NORMAL %
T(ABL1,BCR)B2A2/CONTROL BLD/T: NORMAL %
T(ABL1,BCR)B3A2/CONTROL BLD/T: NORMAL %
T(ABL1,BCR)E1A2/CONTROL BLD/T: NORMAL %

## 2019-01-26 ENCOUNTER — HOSPITAL ENCOUNTER (OUTPATIENT)
Dept: INFUSION THERAPY | Age: 74
End: 2019-01-26
Payer: MEDICARE

## 2019-01-30 LAB
Lab: NORMAL
REFERENCE LAB,REFLB: NORMAL
TEST DESCRIPTION:,ATST: NORMAL

## 2019-03-28 ENCOUNTER — HOSPITAL ENCOUNTER (OUTPATIENT)
Dept: CT IMAGING | Age: 74
Discharge: HOME | End: 2019-03-28
Attending: INTERNAL MEDICINE
Payer: MEDICARE

## 2019-03-28 VITALS — WEIGHT: 255 LBS | HEIGHT: 72 IN | BODY MASS INDEX: 34.54 KG/M2

## 2019-03-28 DIAGNOSIS — R10.12 LEFT UPPER QUADRANT PAIN: ICD-10-CM

## 2019-03-28 LAB — CREAT BLD-MCNC: 0.9 MG/DL (ref 0.8–1.5)

## 2019-03-28 PROCEDURE — 74011636320 HC RX REV CODE- 636/320: Performed by: INTERNAL MEDICINE

## 2019-03-28 PROCEDURE — 74011000258 HC RX REV CODE- 258: Performed by: INTERNAL MEDICINE

## 2019-03-28 PROCEDURE — 82565 ASSAY OF CREATININE: CPT

## 2019-03-28 PROCEDURE — 74177 CT ABD & PELVIS W/CONTRAST: CPT

## 2019-03-28 RX ORDER — SODIUM CHLORIDE 0.9 % (FLUSH) 0.9 %
10 SYRINGE (ML) INJECTION
Status: COMPLETED | OUTPATIENT
Start: 2019-03-28 | End: 2019-03-28

## 2019-03-28 RX ADMIN — IOPAMIDOL 100 ML: 755 INJECTION, SOLUTION INTRAVENOUS at 14:20

## 2019-03-28 RX ADMIN — DIATRIZOATE MEGLUMINE AND DIATRIZOATE SODIUM 15 ML: 660; 100 LIQUID ORAL; RECTAL at 14:20

## 2019-03-28 RX ADMIN — Medication 10 ML: at 14:20

## 2019-03-28 RX ADMIN — SODIUM CHLORIDE 100 ML: 9 INJECTION, SOLUTION INTRAVENOUS at 14:20

## 2019-05-28 ENCOUNTER — HOSPITAL ENCOUNTER (OUTPATIENT)
Dept: LAB | Age: 74
Discharge: HOME | End: 2019-05-28
Payer: MEDICARE

## 2019-05-28 DIAGNOSIS — D50.9 IRON DEFICIENCY ANEMIA, UNSPECIFIED IRON DEFICIENCY ANEMIA TYPE: ICD-10-CM

## 2019-05-28 LAB
ALBUMIN SERPL-MCNC: 4 G/DL (ref 3.2–4.6)
ALBUMIN/GLOB SERPL: 1.1 {RATIO} (ref 1.2–3.5)
ALP SERPL-CCNC: 68 U/L (ref 50–136)
ALT SERPL-CCNC: 43 U/L (ref 12–65)
ANION GAP SERPL CALC-SCNC: 7 MMOL/L (ref 7–16)
AST SERPL-CCNC: 25 U/L (ref 15–37)
BASOPHILS # BLD: 0.1 K/UL (ref 0–0.2)
BASOPHILS NFR BLD: 1 % (ref 0–2)
BILIRUB SERPL-MCNC: 0.5 MG/DL (ref 0.2–1.1)
BUN SERPL-MCNC: 11 MG/DL (ref 8–23)
CALCIUM SERPL-MCNC: 9.5 MG/DL (ref 8.3–10.4)
CHLORIDE SERPL-SCNC: 103 MMOL/L (ref 98–107)
CO2 SERPL-SCNC: 26 MMOL/L (ref 21–32)
CREAT SERPL-MCNC: 0.91 MG/DL (ref 0.8–1.5)
DIFFERENTIAL METHOD BLD: ABNORMAL
EOSINOPHIL # BLD: 0.3 K/UL (ref 0–0.8)
EOSINOPHIL NFR BLD: 3 % (ref 0.5–7.8)
ERYTHROCYTE [DISTWIDTH] IN BLOOD BY AUTOMATED COUNT: 12.9 % (ref 11.9–14.6)
FERRITIN SERPL-MCNC: 626 NG/ML (ref 8–388)
GLOBULIN SER CALC-MCNC: 3.8 G/DL (ref 2.3–3.5)
GLUCOSE SERPL-MCNC: 136 MG/DL (ref 65–100)
HCT VFR BLD AUTO: 43.9 % (ref 41.1–50.3)
HGB BLD-MCNC: 14.9 G/DL (ref 13.6–17.2)
HGB RETIC QN AUTO: 38 PG (ref 29–35)
IMM GRANULOCYTES # BLD AUTO: 0.1 K/UL (ref 0–0.5)
IMM GRANULOCYTES NFR BLD AUTO: 1 % (ref 0–5)
IMM RETICS NFR: 14.2 % (ref 2.3–13.4)
IRON SATN MFR SERPL: 36 %
IRON SERPL-MCNC: 115 UG/DL (ref 35–150)
LYMPHOCYTES # BLD: 3.1 K/UL (ref 0.5–4.6)
LYMPHOCYTES NFR BLD: 31 % (ref 13–44)
MCH RBC QN AUTO: 32.7 PG (ref 26.1–32.9)
MCHC RBC AUTO-ENTMCNC: 33.9 G/DL (ref 31.4–35)
MCV RBC AUTO: 96.3 FL (ref 79.6–97.8)
MONOCYTES # BLD: 0.7 K/UL (ref 0.1–1.3)
MONOCYTES NFR BLD: 8 % (ref 4–12)
NEUTS SEG # BLD: 5.6 K/UL (ref 1.7–8.2)
NEUTS SEG NFR BLD: 57 % (ref 43–78)
NRBC # BLD: 0 K/UL (ref 0–0.2)
PLATELET # BLD AUTO: 195 K/UL (ref 150–450)
PMV BLD AUTO: 9.3 FL (ref 9.4–12.3)
POTASSIUM SERPL-SCNC: 4.7 MMOL/L (ref 3.5–5.1)
PROT SERPL-MCNC: 7.8 G/DL (ref 6.3–8.2)
RBC # BLD AUTO: 4.56 M/UL (ref 4.23–5.67)
RETICS # AUTO: 0.13 M/UL (ref 0.03–0.1)
RETICS/RBC NFR AUTO: 2.8 % (ref 0.3–2)
SODIUM SERPL-SCNC: 136 MMOL/L (ref 136–145)
TIBC SERPL-MCNC: 316 UG/DL (ref 250–450)
WBC # BLD AUTO: 9.9 K/UL (ref 4.3–11.1)

## 2019-05-28 PROCEDURE — 83540 ASSAY OF IRON: CPT

## 2019-05-28 PROCEDURE — 85025 COMPLETE CBC W/AUTO DIFF WBC: CPT

## 2019-05-28 PROCEDURE — 80053 COMPREHEN METABOLIC PANEL: CPT

## 2019-05-28 PROCEDURE — 36415 COLL VENOUS BLD VENIPUNCTURE: CPT

## 2019-05-28 PROCEDURE — 82728 ASSAY OF FERRITIN: CPT

## 2019-05-28 PROCEDURE — 85046 RETICYTE/HGB CONCENTRATE: CPT

## 2019-05-28 PROCEDURE — 82668 ASSAY OF ERYTHROPOIETIN: CPT

## 2019-05-29 LAB — EPO SERPL-ACNC: 6.2 MIU/ML (ref 2.6–18.5)

## 2022-03-18 PROBLEM — D50.9 IRON DEFICIENCY ANEMIA: Status: ACTIVE | Noted: 2018-08-10

## 2022-03-18 PROBLEM — E66.01 SEVERE OBESITY (HCC): Status: ACTIVE | Noted: 2019-01-10

## 2022-03-20 PROBLEM — R68.81 EARLY SATIETY: Status: ACTIVE | Noted: 2018-08-30

## 2022-04-18 ENCOUNTER — HOSPITAL ENCOUNTER (OUTPATIENT)
Dept: LAB | Age: 77
Discharge: HOME | End: 2022-04-18
Payer: MEDICARE

## 2022-04-18 PROCEDURE — 87075 CULTR BACTERIA EXCEPT BLOOD: CPT

## 2022-04-18 PROCEDURE — 87102 FUNGUS ISOLATION CULTURE: CPT

## 2022-04-18 PROCEDURE — 87070 CULTURE OTHR SPECIMN AEROBIC: CPT

## 2022-04-22 LAB
BACTERIA SPEC CULT: NORMAL
GRAM STN SPEC: NORMAL
GRAM STN SPEC: NORMAL
SERVICE CMNT-IMP: NORMAL

## 2022-04-27 LAB
BACTERIA SPEC CULT: NORMAL
SERVICE CMNT-IMP: NORMAL

## 2022-05-02 ENCOUNTER — HOSPITAL ENCOUNTER (OUTPATIENT)
Dept: LAB | Age: 77
Discharge: HOME | End: 2022-05-02
Payer: MEDICARE

## 2022-05-02 PROCEDURE — 87102 FUNGUS ISOLATION CULTURE: CPT

## 2022-05-02 PROCEDURE — 87070 CULTURE OTHR SPECIMN AEROBIC: CPT

## 2022-05-02 PROCEDURE — 87075 CULTR BACTERIA EXCEPT BLOOD: CPT

## 2022-05-06 LAB
BACTERIA SPEC CULT: NORMAL
SERVICE CMNT-IMP: NORMAL

## 2022-05-11 LAB
BACTERIA SPEC CULT: NORMAL
SERVICE CMNT-IMP: NORMAL

## 2022-05-18 LAB
FUNGUS CULTURE, RFCO2T: NEGATIVE
FUNGUS SMEAR, RFCO1T: NORMAL
FUNGUS SPEC CULT: NORMAL
FUNGUS STAIN, 188244: NORMAL
REFLEX TO ID, RFCO3T: NORMAL
SPECIMEN SOURCE: NORMAL
SPECIMEN SOURCE: NORMAL

## 2022-06-01 LAB
FUNGUS CULTURE, RFCO2T: NORMAL
FUNGUS SMEAR, RFCO1T: NORMAL
FUNGUS SPEC CULT: NORMAL
FUNGUS STAIN, 188244: NORMAL
REFLEX TO ID, RFCO3T: NORMAL
SPECIMEN SOURCE: NORMAL
SPECIMEN SOURCE: NORMAL

## 2023-09-25 ENCOUNTER — OFFICE VISIT (OUTPATIENT)
Dept: NEUROLOGY | Age: 78
End: 2023-09-25
Payer: MEDICARE

## 2023-09-25 VITALS
SYSTOLIC BLOOD PRESSURE: 146 MMHG | WEIGHT: 237 LBS | DIASTOLIC BLOOD PRESSURE: 83 MMHG | HEART RATE: 88 BPM | OXYGEN SATURATION: 94 %

## 2023-09-25 DIAGNOSIS — I63.81 MULTIPLE LACUNAR INFARCTS (HCC): Primary | ICD-10-CM

## 2023-09-25 PROCEDURE — 99204 OFFICE O/P NEW MOD 45 MIN: CPT | Performed by: PSYCHIATRY & NEUROLOGY

## 2023-09-25 PROCEDURE — G8427 DOCREV CUR MEDS BY ELIG CLIN: HCPCS | Performed by: PSYCHIATRY & NEUROLOGY

## 2023-09-25 PROCEDURE — 1123F ACP DISCUSS/DSCN MKR DOCD: CPT | Performed by: PSYCHIATRY & NEUROLOGY

## 2023-09-25 PROCEDURE — 4004F PT TOBACCO SCREEN RCVD TLK: CPT | Performed by: PSYCHIATRY & NEUROLOGY

## 2023-09-25 PROCEDURE — G8421 BMI NOT CALCULATED: HCPCS | Performed by: PSYCHIATRY & NEUROLOGY

## 2023-09-25 RX ORDER — BUDESONIDE AND FORMOTEROL FUMARATE DIHYDRATE 160; 4.5 UG/1; UG/1
AEROSOL RESPIRATORY (INHALATION)
COMMUNITY
Start: 2020-11-16

## 2023-09-25 RX ORDER — MELATONIN 10 MG
10 CAPSULE ORAL NIGHTLY
COMMUNITY

## 2023-09-25 RX ORDER — OXYBUTYNIN CHLORIDE 5 MG/1
TABLET ORAL
COMMUNITY
Start: 2023-09-12

## 2023-09-25 RX ORDER — OMEGA-3 FATTY ACIDS CAP DELAYED RELEASE 1000 MG 1000 MG
3000 CAPSULE DELAYED RELEASE ORAL 2 TIMES DAILY
COMMUNITY

## 2023-09-25 ASSESSMENT — ENCOUNTER SYMPTOMS
RESPIRATORY NEGATIVE: 1
GASTROINTESTINAL NEGATIVE: 1
EYES NEGATIVE: 1
ALLERGIC/IMMUNOLOGIC NEGATIVE: 1

## 2023-10-03 ENCOUNTER — HOSPITAL ENCOUNTER (OUTPATIENT)
Dept: CT IMAGING | Age: 78
Discharge: HOME | End: 2023-10-06
Attending: PSYCHIATRY & NEUROLOGY
Payer: MEDICARE

## 2023-10-03 DIAGNOSIS — I63.81 MULTIPLE LACUNAR INFARCTS (HCC): ICD-10-CM

## 2023-10-03 LAB — CREAT BLD-MCNC: 1.1 MG/DL (ref 0.8–1.5)

## 2023-10-03 PROCEDURE — 6360000004 HC RX CONTRAST MEDICATION: Performed by: PSYCHIATRY & NEUROLOGY

## 2023-10-03 PROCEDURE — 82565 ASSAY OF CREATININE: CPT

## 2023-10-03 PROCEDURE — 70496 CT ANGIOGRAPHY HEAD: CPT

## 2023-10-03 RX ADMIN — IOPAMIDOL 50 ML: 755 INJECTION, SOLUTION INTRAVENOUS at 14:37

## 2023-10-04 ENCOUNTER — CLINICAL DOCUMENTATION (OUTPATIENT)
Dept: NEUROLOGY | Age: 78
End: 2023-10-04

## 2023-10-04 NOTE — PROGRESS NOTES
I did review the CTA results the ascending aorta is a little bit dilated at 4 cm something we will have to need to keep an eye on if it changes we will have to get him to a vascular surgeon. He is on adult aspirin and Crestor at this time for stroke prevention. I changed him from 81 aspirin to an adult aspirin which I think is appropriate at this time.

## 2024-02-15 ENCOUNTER — TELEPHONE (OUTPATIENT)
Dept: UROLOGY | Age: 79
End: 2024-02-15

## 2024-02-15 ENCOUNTER — OFFICE VISIT (OUTPATIENT)
Dept: UROLOGY | Age: 79
End: 2024-02-15
Payer: MEDICARE

## 2024-02-15 DIAGNOSIS — N20.0 KIDNEY STONE: Primary | ICD-10-CM

## 2024-02-15 DIAGNOSIS — N20.0 KIDNEY STONE: ICD-10-CM

## 2024-02-15 LAB
BILIRUBIN, URINE, POC: NEGATIVE
BLOOD URINE, POC: NORMAL
GLUCOSE URINE, POC: NEGATIVE
KETONES, URINE, POC: NEGATIVE
LEUKOCYTE ESTERASE, URINE, POC: NORMAL
NITRITE, URINE, POC: NEGATIVE
PH, URINE, POC: 6.5 (ref 4.6–8)
PROTEIN,URINE, POC: 30
SPECIFIC GRAVITY, URINE, POC: 1.02 (ref 1–1.03)
URINALYSIS CLARITY, POC: NORMAL
URINALYSIS COLOR, POC: NORMAL
UROBILINOGEN, POC: NORMAL

## 2024-02-15 PROCEDURE — 74018 RADEX ABDOMEN 1 VIEW: CPT | Performed by: NURSE PRACTITIONER

## 2024-02-15 PROCEDURE — 99204 OFFICE O/P NEW MOD 45 MIN: CPT | Performed by: NURSE PRACTITIONER

## 2024-02-15 PROCEDURE — 4004F PT TOBACCO SCREEN RCVD TLK: CPT | Performed by: NURSE PRACTITIONER

## 2024-02-15 PROCEDURE — G8484 FLU IMMUNIZE NO ADMIN: HCPCS | Performed by: NURSE PRACTITIONER

## 2024-02-15 PROCEDURE — 81003 URINALYSIS AUTO W/O SCOPE: CPT | Performed by: NURSE PRACTITIONER

## 2024-02-15 PROCEDURE — G8427 DOCREV CUR MEDS BY ELIG CLIN: HCPCS | Performed by: NURSE PRACTITIONER

## 2024-02-15 PROCEDURE — G8421 BMI NOT CALCULATED: HCPCS | Performed by: NURSE PRACTITIONER

## 2024-02-15 PROCEDURE — 1123F ACP DISCUSS/DSCN MKR DOCD: CPT | Performed by: NURSE PRACTITIONER

## 2024-02-15 NOTE — TELEPHONE ENCOUNTER
----- Message from MARIIA Adam - CNP sent at 2/15/2024  4:41 PM EST -----  Left ESWL. On  per Dr. Huizar. Will need to hold.

## 2024-02-15 NOTE — PROGRESS NOTES
[facility]  [address] [facility] [address]  [place] [address]  [phone]          Arnoldo Haji Stone  : 1945    Chief Complaint   Patient presents with    New Patient    Nephrolithiasis          HPI     Arnoldo Dash is a 78 y.o. male referred by Dr. Mishel Conroy for kidney stone. He reported left flank pain x 6 weeks to PCP. He was sent for CT A/P at [facility] on 24 showing 8x5 mm left renal stone w mild hydro, multiple rupinder non obstructing renal stones. And BWT. Images are NA but will be requested. Cipro started.     Here today for fu. Hx of kidney stones wo surgical intervention. Reports dull left flank pain. Afebrile. KUB in office today reviewed by myself and shows 8 mm stone to left renal pelvis.     ?hx of elevated PSA. Reports benign prostate bx 20+ yrs ago. No PSA on file.      daily.             Past Medical History:   Diagnosis Date    Adverse effect of anesthesia     \"not enough sedation with first colonoscopy---fine was later procedures\"    Aortic stenosis, mild     echo 5/24/15:  noted as mild aortic stenosis.  YESSICA on 5/22/15 notes DUTCH (planimetry)  2.21 cm2    Arthritis     knees    Claustrophobia     \"tolerated MRI, but didn't like it\"    Diabetes (HCC) dx     Type 2, oral med, owns glucometer--rarely checks glucose, last HA1C was 6.1 in 2015    History of kidney stones     2, passed on his own    Hypertension     managed with medication     Obesity (BMI 30-39.9)     BMI 32.4    Sleep apnea     wears CPAP     Stroke (HCC)     MRI 2015:  acute basal ganglia stroke on the right--lacunar at the genu and she has no aneurysm, possible \"cut off\" of a branch right MCA, and bilateral leuko-areas.    Stroke (HCC)     TIA x 2 (not sure when--noted on imaging)  2nd TIA was 2015.  No residual weakness.       Past Surgical History:   Procedure Laterality Date    APPENDECTOMY      COLONOSCOPY N/A 2018    COLONOSCOPY  BMI 34 performed by Toñito Serrano MD

## 2024-02-20 ENCOUNTER — TELEPHONE (OUTPATIENT)
Dept: UROLOGY | Age: 79
End: 2024-02-20

## 2024-02-20 NOTE — TELEPHONE ENCOUNTER
Cardiac Pre-operative Assessment      Physician or Practice Requesting Medication Clearance or Risk Assessment: VICTOR MANUEL SILVA NP    : Anita    Contact Phone Number: [phone]    Fax Number: [phone]    Date of Surgery/Procedure: March 2024    Type of Surgery or Procedure: Lithotripsy      Medications to hold  Aspirin 325mg    # Days pre-procedure to hold 5 - 7 days    Restart medication ________      Please respond back in this telephone encounter.  Thank you.

## 2024-02-23 ENCOUNTER — PREP FOR PROCEDURE (OUTPATIENT)
Dept: UROLOGY | Age: 79
End: 2024-02-23

## 2024-02-23 DIAGNOSIS — N20.0 KIDNEY STONE: Primary | ICD-10-CM

## 2024-02-23 NOTE — TELEPHONE ENCOUNTER
Procedures: Procedure(s):   EXTRACORPOREAL SHOCK WAVE LITHOTRIPSY / LEFT   Date: 3/12/2024   Time: 1320   Location: D OP OR 02     Scheduled.  Please complete orders.  Post op appt scheduled.

## 2024-03-04 ENCOUNTER — OFFICE VISIT (OUTPATIENT)
Dept: NEUROLOGY | Age: 79
End: 2024-03-04
Payer: MEDICARE

## 2024-03-04 DIAGNOSIS — I77.810 DILATION OF THORACIC AORTA (HCC): ICD-10-CM

## 2024-03-04 DIAGNOSIS — I63.9 CEREBROVASCULAR ACCIDENT (CVA), UNSPECIFIED MECHANISM (HCC): Primary | ICD-10-CM

## 2024-03-04 PROCEDURE — 4004F PT TOBACCO SCREEN RCVD TLK: CPT | Performed by: PSYCHIATRY & NEUROLOGY

## 2024-03-04 PROCEDURE — G8421 BMI NOT CALCULATED: HCPCS | Performed by: PSYCHIATRY & NEUROLOGY

## 2024-03-04 PROCEDURE — 1123F ACP DISCUSS/DSCN MKR DOCD: CPT | Performed by: PSYCHIATRY & NEUROLOGY

## 2024-03-04 PROCEDURE — 99214 OFFICE O/P EST MOD 30 MIN: CPT | Performed by: PSYCHIATRY & NEUROLOGY

## 2024-03-04 PROCEDURE — G8484 FLU IMMUNIZE NO ADMIN: HCPCS | Performed by: PSYCHIATRY & NEUROLOGY

## 2024-03-04 PROCEDURE — G8427 DOCREV CUR MEDS BY ELIG CLIN: HCPCS | Performed by: PSYCHIATRY & NEUROLOGY

## 2024-03-04 ASSESSMENT — ENCOUNTER SYMPTOMS
EYES NEGATIVE: 1
RESPIRATORY NEGATIVE: 1
GASTROINTESTINAL NEGATIVE: 1
ALLERGIC/IMMUNOLOGIC NEGATIVE: 1

## 2024-03-04 NOTE — PROGRESS NOTES
mechanism (HCC) the patient unfortunately has had 2 small lacunar infarcts 1 was acute Jose A was old.  His CTA supports using aspirin 325 daily.  And organ to repeat his CTA in November.  If the thoracic aortic is further dilated we will have vascular surgery evaluate the patient.  If any blockages of the arteries change we will address those at those time.  He is stable comfortable and doing very well and high functioning.    Dilation of thoracic aorta (HCC)        The Diagnosis and differential diagnostic considerations, and Rx Tx were reviewed with the patient at length.           No orders of the defined types were placed in this encounter.         I have spent greater than 50% of visit discussing and counseling of patient  for treatment and diagnostic plan review. Total time30     .      Notes: Patient is to continue all medications as directed by prescribing physicians. Continuations on today's visit are made based on the patient's report of current medications.             Dr. Rafael Huizar  Consultation Neurology, Neurodiagnostics and Neurotherapeutics  Neuroelectrophysiology, EEG, EMG  [address], [address] [place] [address]  Phone: [phone] Fax [phone]

## 2024-03-06 ENCOUNTER — TELEPHONE (OUTPATIENT)
Dept: UROLOGY | Age: 79
End: 2024-03-06

## 2024-03-07 NOTE — PRE-PROCEDURE INSTRUCTIONS
at the [facility].  > Do not wear make-up, nail polish, lotions, cologne, perfumes, powders, or oil on skin. Artificial nails are not permitted.

## 2024-03-11 ENCOUNTER — ANESTHESIA EVENT (OUTPATIENT)
Dept: SURGERY | Age: 79
End: 2024-03-11
Payer: MEDICARE

## 2024-03-12 ENCOUNTER — HOSPITAL ENCOUNTER (OUTPATIENT)
Age: 79
Setting detail: OUTPATIENT SURGERY
Discharge: HOME | End: 2024-03-12
Attending: UROLOGY | Admitting: UROLOGY
Payer: MEDICARE

## 2024-03-12 ENCOUNTER — ANESTHESIA (OUTPATIENT)
Dept: SURGERY | Age: 79
End: 2024-03-12
Payer: MEDICARE

## 2024-03-12 VITALS
DIASTOLIC BLOOD PRESSURE: 65 MMHG | TEMPERATURE: 97.5 F | OXYGEN SATURATION: 92 % | SYSTOLIC BLOOD PRESSURE: 147 MMHG | RESPIRATION RATE: 18 BRPM | HEART RATE: 79 BPM | BODY MASS INDEX: 31.81 KG/M2 | WEIGHT: 240 LBS | HEIGHT: 73 IN

## 2024-03-12 DIAGNOSIS — N20.0 KIDNEY STONE: Primary | ICD-10-CM

## 2024-03-12 LAB
GLUCOSE BLD STRIP.AUTO-MCNC: 144 MG/DL (ref 65–100)
SERVICE CMNT-IMP: ABNORMAL

## 2024-03-12 PROCEDURE — 2709999900 HC NON-CHARGEABLE SUPPLY: Performed by: UROLOGY

## 2024-03-12 PROCEDURE — 2500000003 HC RX 250 WO HCPCS: Performed by: NURSE ANESTHETIST, CERTIFIED REGISTERED

## 2024-03-12 PROCEDURE — 3700000000 HC ANESTHESIA ATTENDED CARE: Performed by: UROLOGY

## 2024-03-12 PROCEDURE — 82962 GLUCOSE BLOOD TEST: CPT

## 2024-03-12 PROCEDURE — 2580000003 HC RX 258: Performed by: NURSE ANESTHETIST, CERTIFIED REGISTERED

## 2024-03-12 PROCEDURE — 3600000004 HC SURGERY LEVEL 4 BASE: Performed by: UROLOGY

## 2024-03-12 PROCEDURE — 6360000002 HC RX W HCPCS: Performed by: NURSE ANESTHETIST, CERTIFIED REGISTERED

## 2024-03-12 PROCEDURE — 6370000000 HC RX 637 (ALT 250 FOR IP): Performed by: ANESTHESIOLOGY

## 2024-03-12 PROCEDURE — 6360000002 HC RX W HCPCS: Performed by: NURSE PRACTITIONER

## 2024-03-12 PROCEDURE — 7100000001 HC PACU RECOVERY - ADDTL 15 MIN: Performed by: UROLOGY

## 2024-03-12 PROCEDURE — 2580000003 HC RX 258: Performed by: ANESTHESIOLOGY

## 2024-03-12 PROCEDURE — 3700000001 HC ADD 15 MINUTES (ANESTHESIA): Performed by: UROLOGY

## 2024-03-12 PROCEDURE — 7100000010 HC PHASE II RECOVERY - FIRST 15 MIN: Performed by: UROLOGY

## 2024-03-12 PROCEDURE — 7100000000 HC PACU RECOVERY - FIRST 15 MIN: Performed by: UROLOGY

## 2024-03-12 PROCEDURE — 50590 FRAGMENTING OF KIDNEY STONE: CPT | Performed by: UROLOGY

## 2024-03-12 PROCEDURE — 3600000014 HC SURGERY LEVEL 4 ADDTL 15MIN: Performed by: UROLOGY

## 2024-03-12 PROCEDURE — 6360000002 HC RX W HCPCS: Performed by: ANESTHESIOLOGY

## 2024-03-12 RX ORDER — SODIUM CHLORIDE 0.9 % (FLUSH) 0.9 %
5-40 SYRINGE (ML) INJECTION EVERY 12 HOURS SCHEDULED
Status: DISCONTINUED | OUTPATIENT
Start: 2024-03-12 | End: 2024-03-12 | Stop reason: HOSPADM

## 2024-03-12 RX ORDER — HYDROCODONE BITARTRATE AND ACETAMINOPHEN 5; 325 MG/1; MG/1
1 TABLET ORAL EVERY 6 HOURS PRN
Qty: 12 TABLET | Refills: 0 | Status: SHIPPED | OUTPATIENT
Start: 2024-03-12 | End: 2024-03-15

## 2024-03-12 RX ORDER — ACETAMINOPHEN 500 MG
1000 TABLET ORAL ONCE
Status: COMPLETED | OUTPATIENT
Start: 2024-03-12 | End: 2024-03-12

## 2024-03-12 RX ORDER — MIDAZOLAM HYDROCHLORIDE 2 MG/2ML
2 INJECTION, SOLUTION INTRAMUSCULAR; INTRAVENOUS
Status: DISCONTINUED | OUTPATIENT
Start: 2024-03-12 | End: 2024-03-12 | Stop reason: HOSPADM

## 2024-03-12 RX ORDER — LIDOCAINE HYDROCHLORIDE 20 MG/ML
INJECTION, SOLUTION EPIDURAL; INFILTRATION; INTRACAUDAL; PERINEURAL PRN
Status: DISCONTINUED | OUTPATIENT
Start: 2024-03-12 | End: 2024-03-12 | Stop reason: SDUPTHER

## 2024-03-12 RX ORDER — HYDROMORPHONE HYDROCHLORIDE 2 MG/ML
0.5 INJECTION, SOLUTION INTRAMUSCULAR; INTRAVENOUS; SUBCUTANEOUS ONCE
Status: COMPLETED | OUTPATIENT
Start: 2024-03-12 | End: 2024-03-12

## 2024-03-12 RX ORDER — OXYCODONE HYDROCHLORIDE 5 MG/1
5 TABLET ORAL
Status: DISCONTINUED | OUTPATIENT
Start: 2024-03-12 | End: 2024-03-12 | Stop reason: HOSPADM

## 2024-03-12 RX ORDER — HYDROMORPHONE HYDROCHLORIDE 2 MG/ML
0.5 INJECTION, SOLUTION INTRAMUSCULAR; INTRAVENOUS; SUBCUTANEOUS EVERY 10 MIN PRN
Status: DISCONTINUED | OUTPATIENT
Start: 2024-03-12 | End: 2024-03-12 | Stop reason: HOSPADM

## 2024-03-12 RX ORDER — ONDANSETRON 2 MG/ML
4 INJECTION INTRAMUSCULAR; INTRAVENOUS
Status: DISCONTINUED | OUTPATIENT
Start: 2024-03-12 | End: 2024-03-12 | Stop reason: HOSPADM

## 2024-03-12 RX ORDER — SODIUM CHLORIDE, SODIUM LACTATE, POTASSIUM CHLORIDE, CALCIUM CHLORIDE 600; 310; 30; 20 MG/100ML; MG/100ML; MG/100ML; MG/100ML
INJECTION, SOLUTION INTRAVENOUS CONTINUOUS
Status: DISCONTINUED | OUTPATIENT
Start: 2024-03-12 | End: 2024-03-12 | Stop reason: HOSPADM

## 2024-03-12 RX ORDER — IPRATROPIUM BROMIDE AND ALBUTEROL SULFATE 2.5; .5 MG/3ML; MG/3ML
1 SOLUTION RESPIRATORY (INHALATION)
Status: DISCONTINUED | OUTPATIENT
Start: 2024-03-12 | End: 2024-03-12 | Stop reason: HOSPADM

## 2024-03-12 RX ORDER — FENTANYL CITRATE 50 UG/ML
50 INJECTION, SOLUTION INTRAMUSCULAR; INTRAVENOUS EVERY 5 MIN PRN
Status: DISCONTINUED | OUTPATIENT
Start: 2024-03-12 | End: 2024-03-12 | Stop reason: HOSPADM

## 2024-03-12 RX ORDER — LIDOCAINE HYDROCHLORIDE 10 MG/ML
1 INJECTION, SOLUTION INFILTRATION; PERINEURAL
Status: DISCONTINUED | OUTPATIENT
Start: 2024-03-12 | End: 2024-03-12 | Stop reason: HOSPADM

## 2024-03-12 RX ORDER — ONDANSETRON 2 MG/ML
INJECTION INTRAMUSCULAR; INTRAVENOUS PRN
Status: DISCONTINUED | OUTPATIENT
Start: 2024-03-12 | End: 2024-03-12 | Stop reason: SDUPTHER

## 2024-03-12 RX ORDER — HALOPERIDOL 5 MG/ML
1 INJECTION INTRAMUSCULAR
Status: DISCONTINUED | OUTPATIENT
Start: 2024-03-12 | End: 2024-03-12 | Stop reason: HOSPADM

## 2024-03-12 RX ORDER — PROPOFOL 10 MG/ML
INJECTION, EMULSION INTRAVENOUS PRN
Status: DISCONTINUED | OUTPATIENT
Start: 2024-03-12 | End: 2024-03-12 | Stop reason: SDUPTHER

## 2024-03-12 RX ORDER — SODIUM CHLORIDE 0.9 % (FLUSH) 0.9 %
5-40 SYRINGE (ML) INJECTION PRN
Status: DISCONTINUED | OUTPATIENT
Start: 2024-03-12 | End: 2024-03-12 | Stop reason: HOSPADM

## 2024-03-12 RX ORDER — TAMSULOSIN HYDROCHLORIDE 0.4 MG/1
0.4 CAPSULE ORAL DAILY
Qty: 20 CAPSULE | Refills: 0 | Status: SHIPPED | OUTPATIENT
Start: 2024-03-12

## 2024-03-12 RX ORDER — NALOXONE HYDROCHLORIDE 0.4 MG/ML
INJECTION, SOLUTION INTRAMUSCULAR; INTRAVENOUS; SUBCUTANEOUS PRN
Status: DISCONTINUED | OUTPATIENT
Start: 2024-03-12 | End: 2024-03-12 | Stop reason: HOSPADM

## 2024-03-12 RX ADMIN — SODIUM CHLORIDE, POTASSIUM CHLORIDE, SODIUM LACTATE AND CALCIUM CHLORIDE: 600; 310; 30; 20 INJECTION, SOLUTION INTRAVENOUS at 11:18

## 2024-03-12 RX ADMIN — ONDANSETRON 4 MG: 2 INJECTION INTRAMUSCULAR; INTRAVENOUS at 14:03

## 2024-03-12 RX ADMIN — PHENYLEPHRINE HYDROCHLORIDE 100 MCG: 10 INJECTION INTRAVENOUS at 14:21

## 2024-03-12 RX ADMIN — PROPOFOL 180 MG: 10 INJECTION, EMULSION INTRAVENOUS at 13:54

## 2024-03-12 RX ADMIN — Medication 2000 MG: at 13:58

## 2024-03-12 RX ADMIN — PHENYLEPHRINE HYDROCHLORIDE 100 MCG: 10 INJECTION INTRAVENOUS at 14:34

## 2024-03-12 RX ADMIN — HYDROMORPHONE HYDROCHLORIDE 0.5 MG: 2 INJECTION, SOLUTION INTRAMUSCULAR; INTRAVENOUS; SUBCUTANEOUS at 12:35

## 2024-03-12 RX ADMIN — ACETAMINOPHEN 1000 MG: 500 TABLET ORAL at 11:10

## 2024-03-12 RX ADMIN — PROPOFOL 20 MG: 10 INJECTION, EMULSION INTRAVENOUS at 14:22

## 2024-03-12 RX ADMIN — LIDOCAINE HYDROCHLORIDE 100 MG: 20 INJECTION, SOLUTION EPIDURAL; INFILTRATION; INTRACAUDAL; PERINEURAL at 13:54

## 2024-03-12 ASSESSMENT — LIFESTYLE VARIABLES: SMOKING_STATUS: 1

## 2024-03-12 ASSESSMENT — PAIN SCALES - GENERAL
PAINLEVEL_OUTOF10: 6
PAINLEVEL_OUTOF10: 8

## 2024-03-12 ASSESSMENT — PAIN - FUNCTIONAL ASSESSMENT: PAIN_FUNCTIONAL_ASSESSMENT: 0-10

## 2024-03-12 ASSESSMENT — PAIN DESCRIPTION - LOCATION: LOCATION: BACK

## 2024-03-12 ASSESSMENT — PAIN DESCRIPTION - ORIENTATION: ORIENTATION: LEFT

## 2024-03-12 NOTE — PERIOP NOTE
Preoperative flank skin condition: WARM DRY INTACT  Lead shield: Yes  Patient ear protection: Yes   Gel applied to patient's flank and Lithotripsy head: Yes   Starting power level: 7  Shock start time (first  fluoroscopy): 1403  Shock stop time (last lithotripsy shock): 1438  Ending power level: 11  Total shocks: 3000  Total fluoroscopy time: 59  Postoperative flank skin condition: UNCHANGED

## 2024-03-12 NOTE — H&P
clear  Neurological - normal speech, no focal findings or movement disorder noted on gross visual exam  Musculoskeletal - normal gait and station  Skin - normal coloration and turgor, no rashes              Assessment and Plan      ICD-10-CM     1. Kidney stone  N20.0 AMB POC URINALYSIS DIP STICK AUTO W/O MICRO       AMB POC XRAY ABDOMEN 1 VIEW       KUB findings discussed. Tx options for left renal stone discussed including ESWL vs URS. After our discussion, the patient would like to proceed with left ESWL.  Risks of ESWL that we discussed were bleeding, infection, flank pain, bruising, renal hematoma, injury to surrounding structures, incomplete fragmentation of stones, steinstrasse, inability to pass stone fragments requiring additional operative intervention in the form of ureteroscopy/laser lithotripsy and/or stent placement, renal failure, hypertension, risks of general anesthesia, recurrent stones.  The patient expressed understanding of the above.  Will need to hold ASA. Will request clearance. Advised to hold all other NSAIDS 5 days prior to procedure.

## 2024-03-12 NOTE — ANESTHESIA POSTPROCEDURE EVALUATION
Department of Anesthesiology  Postprocedure Note    Patient: Arnoldo Haji Stone  MRN: [id number]  YOB: 1945  Date of evaluation: 3/12/2024    Procedure Summary       Date: 03/12/24 Room / Location: [facility] OP OR 07 / [facility]    Anesthesia Start: 1346 Anesthesia Stop: 1446    Procedure: EXTRACORPOREAL SHOCK WAVE LITHOTRIPSY / LEFT (Left: Flank) Diagnosis:       Kidney stone      (Kidney stone [N20.0])    Surgeons: Ruddy Otto Jr., MD Responsible Provider: Frank Burk MD    Anesthesia Type: general ASA Status: 3            Anesthesia Type: No value filed.    Charanjit Phase I: Charanjit Score: 8    Charanjit Phase II:      Anesthesia Post Evaluation    Patient location during evaluation: PACU  Patient participation: complete - patient participated  Level of consciousness: awake and alert  Airway patency: patent  Nausea & Vomiting: no nausea and no vomiting  Cardiovascular status: hemodynamically stable  Respiratory status: acceptable, nonlabored ventilation and spontaneous ventilation  Hydration status: euvolemic  Comments: BP (!) 146/72   Pulse 84   Temp 97.5 °F (36.4 °C) (Skin)   Resp 18   Ht 1.854 m (6' 1\")   Wt 108.9 kg (240 lb)   SpO2 97%   BMI 31.66 kg/m²     Multimodal analgesia pain management approach  Pain management: adequate and satisfactory to patient    No notable events documented.

## 2024-03-12 NOTE — ANESTHESIA PRE PROCEDURE
PROT 7.8 05/28/2019 08:09 AM    CALCIUM 9.5 05/28/2019 08:09 AM    BILITOT 0.5 05/28/2019 08:09 AM    ALKPHOS 68 05/28/2019 08:09 AM    AST 25 05/28/2019 08:09 AM    ALT 43 05/28/2019 08:09 AM       POC Tests:   Recent Labs     03/12/24  1118   POCGLU 144*       Coags: No results found for: \"PROTIME\", \"INR\", \"APTT\"    HCG (If Applicable): No results found for: \"PREGTESTUR\", \"PREGSERUM\", \"HCG\", \"HCGQUANT\"     ABGs: No results found for: \"PHART\", \"PO2ART\", \"BBR6LES\", \"SOO4THG\", \"BEART\", \"Q7KJBPWV\"     Type & Screen (If Applicable):  No results found for: \"LABABO\", \"LABRH\"    Drug/Infectious Status (If Applicable):  No results found for: \"HIV\", \"HEPCAB\"    COVID-19 Screening (If Applicable): No results found for: \"COVID19\"        Anesthesia Evaluation  Patient summary reviewed  Airway: Mallampati: II          Dental: normal exam         Pulmonary:Negative Pulmonary ROS breath sounds clear to auscultation  (+)     sleep apnea:       current smoker                           Cardiovascular:  Exercise tolerance: good (>4 METS)  (+) hypertension:    (-) past MI, murmur and peripheral edema      Rhythm: regular  Rate: normal                 ROS comment: 2020 normal TTE     Neuro/Psych:   Negative Neuro/Psych ROS  (+) CVA:, TIA            GI/Hepatic/Renal: Neg GI/Hepatic/Renal ROS           ROS comment: Obesity  .   Endo/Other: Negative Endo/Other ROS   (+) Diabetes.                 Abdominal:             Vascular: negative vascular ROS.         Other Findings:       Anesthesia Plan      general     ASA 3     (LMA)  Induction: intravenous.      Anesthetic plan and risks discussed with patient.                    Frank Burk MD   3/12/2024

## 2024-03-12 NOTE — BRIEF OP NOTE
Brief Postoperative Note      Patient: Arnoldo Haji Stone  YOB: 1945  MRN: [id number]    Date of Procedure: 3/12/2024    Pre-Op Diagnosis Codes:     * Kidney stone [N20.0]    Post-Op Diagnosis: Same       Procedure(s):  EXTRACORPOREAL SHOCK WAVE LITHOTRIPSY / LEFT    Surgeon(s):  Graeme Otto Jr., MD    Assistant:  * No surgical staff found *    Anesthesia: General    Estimated Blood Loss (mL): Minimal    Complications: None    Specimens:   * No specimens in log *    Implants:  * No implants in log *      Drains: * No LDAs found *    Findings: see op note      Electronically signed by GRAEME OTTO JR, MD on 3/12/2024 at 2:51 PM

## 2024-03-13 NOTE — OP NOTE
[address]  [address]                            OPERATIVE REPORT      PATIENT NAME: LORELEI ROSS            : 1945  MED REC NO: [id number]                       ROOM: OPOR  ACCOUNT NO: [id number]                       ADMIT DATE: 2024  PROVIDER: Graeme Otto Jr, MD    DATE OF SERVICE:  2024    PREOPERATIVE DIAGNOSES:  Left renal calculus.    POSTOPERATIVE DIAGNOSES:  Left renal calculus.    PROCEDURES PERFORMED:  Left extracorporeal shockwave lithotripsy.    SURGEON:  Graeme Otto Jr, MD    ASSISTANT:  None.    ANESTHESIA:  General.    ESTIMATED BLOOD LOSS:  None.    SPECIMENS REMOVED:  None.    INTRAOPERATIVE FINDINGS:  8 mm stone in the left mid kidney.     COMPLICATIONS:  None.    IMPLANTS:  None.    INDICATIONS:  The patient with left renal colic due to 8 mm stone in the left renal pelvis.    DESCRIPTION OF PROCEDURE:  The patient was given a general anesthetic, placed in supine position on the lithotripsy treatment table.  Fluoroscopy was used to identify the stone.  This was an 8 mm stone located in the area of the left mid kidney or left renal pelvis.  Treatment was begun on low power settings gradually increased up to a power setting of 11 kv.  A total of 3000 shocks were given.  At the end of the procedure, we could still see a calcification within the mid kidney, but the stone appeared to have been partially fragmented.  There were no complications.    PLAN:  He will be discharged home.  We will add tamsulosin and pain medication.  He is to return in 2 weeks for KUB.        GRAEME OTTO JR, MD      JRM/AQS  D:  2024 14:51:26  T:  2024 17:19:50  JOB #:  [id number]

## 2024-03-26 ENCOUNTER — OFFICE VISIT (OUTPATIENT)
Dept: UROLOGY | Age: 79
End: 2024-03-26
Payer: MEDICARE

## 2024-03-26 DIAGNOSIS — N20.0 KIDNEY STONE: Primary | ICD-10-CM

## 2024-03-26 LAB
BILIRUBIN, URINE, POC: NEGATIVE
BLOOD URINE, POC: NORMAL
GLUCOSE URINE, POC: NEGATIVE
KETONES, URINE, POC: NEGATIVE
LEUKOCYTE ESTERASE, URINE, POC: NEGATIVE
NITRITE, URINE, POC: NEGATIVE
PH, URINE, POC: 6 (ref 4.6–8)
PROTEIN,URINE, POC: NEGATIVE
SPECIFIC GRAVITY, URINE, POC: 1.02 (ref 1–1.03)
URINALYSIS CLARITY, POC: NORMAL
URINALYSIS COLOR, POC: NORMAL
UROBILINOGEN, POC: NORMAL

## 2024-03-26 PROCEDURE — 74018 RADEX ABDOMEN 1 VIEW: CPT | Performed by: NURSE PRACTITIONER

## 2024-03-26 PROCEDURE — 81003 URINALYSIS AUTO W/O SCOPE: CPT | Performed by: NURSE PRACTITIONER

## 2024-03-26 ASSESSMENT — ENCOUNTER SYMPTOMS: BACK PAIN: 0

## 2024-04-03 ENCOUNTER — TELEPHONE (OUTPATIENT)
Dept: UROLOGY | Age: 79
End: 2024-04-03

## 2024-04-03 ENCOUNTER — OFFICE VISIT (OUTPATIENT)
Dept: UROLOGY | Age: 79
End: 2024-04-03
Payer: MEDICARE

## 2024-04-03 DIAGNOSIS — N20.0 KIDNEY STONE: Primary | ICD-10-CM

## 2024-04-03 LAB
BILIRUBIN, URINE, POC: NEGATIVE
BLOOD URINE, POC: NORMAL
GLUCOSE URINE, POC: NEGATIVE
KETONES, URINE, POC: NEGATIVE
LEUKOCYTE ESTERASE, URINE, POC: NORMAL
NITRITE, URINE, POC: NEGATIVE
PH, URINE, POC: 6.5 (ref 4.6–8)
PROTEIN,URINE, POC: NEGATIVE
SPECIFIC GRAVITY, URINE, POC: 1.01 (ref 1–1.03)
URINALYSIS CLARITY, POC: NORMAL
URINALYSIS COLOR, POC: NORMAL
UROBILINOGEN, POC: NORMAL

## 2024-04-03 PROCEDURE — 81003 URINALYSIS AUTO W/O SCOPE: CPT | Performed by: NURSE PRACTITIONER

## 2024-04-03 PROCEDURE — 74018 RADEX ABDOMEN 1 VIEW: CPT | Performed by: NURSE PRACTITIONER

## 2024-04-03 ASSESSMENT — ENCOUNTER SYMPTOMS: BACK PAIN: 0

## 2024-04-03 NOTE — PROGRESS NOTES
Narrative    Not on file     Social Determinants of Health     Financial Resource Strain: Not on file   Food Insecurity: Not on file   Transportation Needs: Not on file   Physical Activity: Not on file   Stress: Not on file   Social Connections: Not on file   Intimate Partner Violence: Not on file   Housing Stability: Not on file     Family History   Problem Relation Age of Onset    Stroke Brother     Hypertension Father     Heart Disease Father     Coronary Art Dis Neg Hx        Review of Systems  Constitutional:   Negative for fever.  Genitourinary:  Negative for hematuria.  Musculoskeletal:  Negative for back pain.      Urinalysis  UA - Dipstick  Results for orders placed or performed in visit on 04/03/24   AMB POC URINALYSIS DIP STICK AUTO W/O MICRO   Result Value Ref Range    Color (UA POC)      Clarity (UA POC)      Glucose, Urine, POC Negative     Bilirubin, Urine, POC Negative     KETONES, Urine, POC Negative     Specific Gravity, Urine, POC 1.015 1.001 - 1.035    Blood (UA POC) Trace-intact     pH, Urine, POC 6.5 4.6 - 8.0    Protein, Urine, POC Negative     Urobilinogen, POC Normal     Nitrite, Urine, POC Negative     Leukocyte Esterase, Urine, POC Trace        PHYSICAL EXAM    There were no vitals taken for this visit.    General appearance - well appearing and in no distress  Mental status - alert, oriented to person, place, and time  Neck - supple, no significant adenopathy   Heart- RRR Normal S1/S2, No murmurs  Chest/Lung-  Quiet, even and easy respiratory effort without use of accessory muscles, BS clear  Neurological - normal speech, no focal findings or movement disorder noted on gross visual exam  Musculoskeletal - normal gait and station  Skin - normal coloration and turgor, no rashes          Assessment and Plan    ICD-10-CM    1. Kidney stone  N20.0 AMB POC URINALYSIS DIP STICK AUTO W/O MICRO     AMB POC XRAY ABDOMEN 1 VIEW      Tx options for left renal stone discussed including repeat ESWL vs

## 2024-04-03 NOTE — TELEPHONE ENCOUNTER
----- Message from MARIIA Adam - CNP sent at 4/3/2024  1:29 PM EDT -----  Left blue plate 1-2 weeks. On . Will need to hold. Recent clearance from prev surgery.

## 2024-04-03 NOTE — H&P (VIEW-ONLY)
[facility]  [address]  [place] [address]  [phone]          Arnoldo Haji Stone  : 1945    Chief Complaint   Patient presents with    Follow-up    Nephrolithiasis          HPI     Arnoldo Dash is a 78 y.o. male referred by Dr. Mishel Conroy for kidney stone. He reported left flank pain x 6 weeks to PCP. He was sent for CT A/P at [facility] on 24 showing 8x5 mm left renal stone w mild hydro, multiple rupinder non obstructing renal stones. And BWT. KUB in office today reviewed by myself and shows 8 mm stone to left renal pelvis. He opted for surgical procedure. He is s/p L ESWL on 3/12/24 by Dr. Otto. Tejas KUB in office  reviewed by myself and shows 5-6 mm to left renal pelvis which is progress from pre-op KUB. He opted to monitor sx. Back today early w int bouts of left flank pain. KUB repeated and this is unchanged from prev images.      ?hx of elevated PSA. Reports benign prostate bx 20+ yrs ago. Reports recent normal PSA w PCP. Labs not on file.       daily.           Past Medical History:   Diagnosis Date    Adverse effect of anesthesia     \"not enough sedation with first colonoscopy---fine was later procedures\"    Aortic stenosis, mild     echo 5/24/15:  noted as mild aortic stenosis.  YESSICA on 5/22/15 notes DUTCH (planimetry)  2.21 cm2    Arthritis     knees    Claustrophobia     \"tolerated MRI, but didn't like it\"    Diabetes (HCC) dx     Type 2, oral med, owns glucometer--rarely checks glucose, last HA1C was 6.1 in 2015    History of kidney stones     2, passed on his own    Hypertension     managed with medication     Obesity (BMI 30-39.9)     BMI 32.4    Sleep apnea     wears CPAP     Stroke (HCC)     MRI 2015:  acute basal ganglia stroke on the right--lacunar at the genu and she has no aneurysm, possible \"cut off\" of a branch right MCA, and bilateral leuko-areas.    Stroke (HCC)     TIA x 2 (not sure when--noted on imaging)  2nd TIA was

## 2024-04-04 NOTE — TELEPHONE ENCOUNTER
Procedures: Procedure(s):   CYSTOSCOPY, LEFT URETEROSCOPY, LASER LITHOTRIPSY, LEFT STENT PLACEMENT   Date: 4/15/2024   Time: 1040   Location: [facility] MAIN OR 01 CYSTO     Scheduled.  Please complete orders.  Post op scheduled.

## 2024-04-12 NOTE — PERIOP NOTE
Patient verified name and .  Order for consent found in EHR and matches case posting; patient verifies procedure.   Type 1B surgery, PAT phone assessment complete.  Orders received.  Labs per surgeon: CBC with diff and UA DOS  Labs per anesthesia protocol: POC K+ and POC glucose DOS    Patient answered medical/surgical history questions at their best of ability. All prior to admission medications documented in EPIC.    Patient instructed to continue taking all prescription medications up to the day of surgery but to take only the following medications the day of surgery according to anesthesia guidelines with a small sip of water: oxybutynin.  Also, patient is requested to take 2 Tylenol in the morning and then again before bed on the day before surgery. Regular or extra strength may be used.       Patient informed that all vitamins and supplements should be held 7 days prior to surgery and NSAIDS 5 days prior to surgery. Prescription meds to hold: vitamins and supplements. Per anesthesia protocol, patient is instructed to switch 325 mg aspirin to an 81 mg .    Patient instructed on the following:    > Arrive at Main Entrance, time of arrival to be called the day before by 1700  > NPO after midnight, unless otherwise indicated, including gum, mints, and ice chips  > Responsible adult must drive patient to the hospital, stay during surgery, and patient will need supervision 24 hours after anesthesia  > Use non moisturizing soap in shower the night before surgery and on the morning of surgery  > All piercings must be removed prior to arrival.    > Leave all valuables (money and jewelry) at home but bring insurance card and ID on DOS.   > You may be required to pay a deductible or co-pay on the day of your procedure. You can pre-pay by calling [phone] if your surgery is at the [facility] or [phone] if your surgery is at the [facility].  > Do not wear make-up, nail polish, lotions, cologne, perfumes,

## 2024-04-14 ENCOUNTER — ANESTHESIA EVENT (OUTPATIENT)
Dept: SURGERY | Age: 79
End: 2024-04-14
Payer: MEDICARE

## 2024-04-15 ENCOUNTER — HOSPITAL ENCOUNTER (OUTPATIENT)
Age: 79
Setting detail: OUTPATIENT SURGERY
Discharge: HOME | End: 2024-04-15
Attending: UROLOGY | Admitting: UROLOGY
Payer: MEDICARE

## 2024-04-15 ENCOUNTER — ANESTHESIA (OUTPATIENT)
Dept: SURGERY | Age: 79
End: 2024-04-15
Payer: MEDICARE

## 2024-04-15 VITALS
HEIGHT: 73 IN | SYSTOLIC BLOOD PRESSURE: 138 MMHG | RESPIRATION RATE: 16 BRPM | HEART RATE: 88 BPM | BODY MASS INDEX: 29.69 KG/M2 | OXYGEN SATURATION: 95 % | TEMPERATURE: 97.8 F | DIASTOLIC BLOOD PRESSURE: 67 MMHG | WEIGHT: 224 LBS

## 2024-04-15 LAB
GLUCOSE BLD STRIP.AUTO-MCNC: 140 MG/DL (ref 65–100)
POTASSIUM BLD-SCNC: 4.5 MMOL/L (ref 3.5–5.1)
SERVICE CMNT-IMP: ABNORMAL

## 2024-04-15 PROCEDURE — C1747 HC ENDOSCOPE, SINGLE, URINARY TRACT: HCPCS | Performed by: UROLOGY

## 2024-04-15 PROCEDURE — C2617 STENT, NON-COR, TEM W/O DEL: HCPCS | Performed by: UROLOGY

## 2024-04-15 PROCEDURE — 82962 GLUCOSE BLOOD TEST: CPT

## 2024-04-15 PROCEDURE — 3700000001 HC ADD 15 MINUTES (ANESTHESIA): Performed by: UROLOGY

## 2024-04-15 PROCEDURE — 2720000010 HC SURG SUPPLY STERILE: Performed by: UROLOGY

## 2024-04-15 PROCEDURE — 6360000002 HC RX W HCPCS

## 2024-04-15 PROCEDURE — 7100000010 HC PHASE II RECOVERY - FIRST 15 MIN: Performed by: UROLOGY

## 2024-04-15 PROCEDURE — 52356 CYSTO/URETERO W/LITHOTRIPSY: CPT | Performed by: UROLOGY

## 2024-04-15 PROCEDURE — C1769 GUIDE WIRE: HCPCS | Performed by: UROLOGY

## 2024-04-15 PROCEDURE — 84132 ASSAY OF SERUM POTASSIUM: CPT

## 2024-04-15 PROCEDURE — 7100000001 HC PACU RECOVERY - ADDTL 15 MIN: Performed by: UROLOGY

## 2024-04-15 PROCEDURE — 6370000000 HC RX 637 (ALT 250 FOR IP): Performed by: ANESTHESIOLOGY

## 2024-04-15 PROCEDURE — 3700000000 HC ANESTHESIA ATTENDED CARE: Performed by: UROLOGY

## 2024-04-15 PROCEDURE — 2580000003 HC RX 258: Performed by: ANESTHESIOLOGY

## 2024-04-15 PROCEDURE — 7100000011 HC PHASE II RECOVERY - ADDTL 15 MIN: Performed by: UROLOGY

## 2024-04-15 PROCEDURE — 2580000003 HC RX 258

## 2024-04-15 PROCEDURE — 7100000000 HC PACU RECOVERY - FIRST 15 MIN: Performed by: UROLOGY

## 2024-04-15 PROCEDURE — C1894 INTRO/SHEATH, NON-LASER: HCPCS | Performed by: UROLOGY

## 2024-04-15 PROCEDURE — 2709999900 HC NON-CHARGEABLE SUPPLY: Performed by: UROLOGY

## 2024-04-15 PROCEDURE — 6360000002 HC RX W HCPCS: Performed by: NURSE PRACTITIONER

## 2024-04-15 PROCEDURE — 3600000014 HC SURGERY LEVEL 4 ADDTL 15MIN: Performed by: UROLOGY

## 2024-04-15 PROCEDURE — 2500000003 HC RX 250 WO HCPCS

## 2024-04-15 PROCEDURE — 3600000004 HC SURGERY LEVEL 4 BASE: Performed by: UROLOGY

## 2024-04-15 DEVICE — URETERAL STENT
Type: IMPLANTABLE DEVICE | Site: URETER | Status: FUNCTIONAL
Brand: PERCUFLEX™ PLUS

## 2024-04-15 RX ORDER — OXYCODONE HYDROCHLORIDE 5 MG/1
5 TABLET ORAL PRN
Status: DISCONTINUED | OUTPATIENT
Start: 2024-04-15 | End: 2024-04-15 | Stop reason: HOSPADM

## 2024-04-15 RX ORDER — SODIUM CHLORIDE 0.9 % (FLUSH) 0.9 %
5-40 SYRINGE (ML) INJECTION EVERY 12 HOURS SCHEDULED
Status: DISCONTINUED | OUTPATIENT
Start: 2024-04-15 | End: 2024-04-15 | Stop reason: HOSPADM

## 2024-04-15 RX ORDER — PROPOFOL 10 MG/ML
INJECTION, EMULSION INTRAVENOUS PRN
Status: DISCONTINUED | OUTPATIENT
Start: 2024-04-15 | End: 2024-04-15 | Stop reason: SDUPTHER

## 2024-04-15 RX ORDER — ROCURONIUM BROMIDE 10 MG/ML
INJECTION, SOLUTION INTRAVENOUS PRN
Status: DISCONTINUED | OUTPATIENT
Start: 2024-04-15 | End: 2024-04-15 | Stop reason: SDUPTHER

## 2024-04-15 RX ORDER — SODIUM CHLORIDE 0.9 % (FLUSH) 0.9 %
5-40 SYRINGE (ML) INJECTION PRN
Status: DISCONTINUED | OUTPATIENT
Start: 2024-04-15 | End: 2024-04-15 | Stop reason: HOSPADM

## 2024-04-15 RX ORDER — GLYCOPYRROLATE 0.2 MG/ML
INJECTION INTRAMUSCULAR; INTRAVENOUS PRN
Status: DISCONTINUED | OUTPATIENT
Start: 2024-04-15 | End: 2024-04-15 | Stop reason: SDUPTHER

## 2024-04-15 RX ORDER — PROCHLORPERAZINE EDISYLATE 5 MG/ML
5 INJECTION INTRAMUSCULAR; INTRAVENOUS
Status: DISCONTINUED | OUTPATIENT
Start: 2024-04-15 | End: 2024-04-15 | Stop reason: HOSPADM

## 2024-04-15 RX ORDER — NALOXONE HYDROCHLORIDE 0.4 MG/ML
INJECTION, SOLUTION INTRAMUSCULAR; INTRAVENOUS; SUBCUTANEOUS PRN
Status: DISCONTINUED | OUTPATIENT
Start: 2024-04-15 | End: 2024-04-15 | Stop reason: HOSPADM

## 2024-04-15 RX ORDER — SODIUM CHLORIDE, SODIUM LACTATE, POTASSIUM CHLORIDE, CALCIUM CHLORIDE 600; 310; 30; 20 MG/100ML; MG/100ML; MG/100ML; MG/100ML
INJECTION, SOLUTION INTRAVENOUS CONTINUOUS
Status: DISCONTINUED | OUTPATIENT
Start: 2024-04-15 | End: 2024-04-15 | Stop reason: HOSPADM

## 2024-04-15 RX ORDER — LIDOCAINE HYDROCHLORIDE 20 MG/ML
INJECTION, SOLUTION EPIDURAL; INFILTRATION; INTRACAUDAL; PERINEURAL PRN
Status: DISCONTINUED | OUTPATIENT
Start: 2024-04-15 | End: 2024-04-15 | Stop reason: SDUPTHER

## 2024-04-15 RX ORDER — SODIUM CHLORIDE 9 MG/ML
INJECTION, SOLUTION INTRAVENOUS PRN
Status: DISCONTINUED | OUTPATIENT
Start: 2024-04-15 | End: 2024-04-15 | Stop reason: HOSPADM

## 2024-04-15 RX ORDER — LIDOCAINE HYDROCHLORIDE 10 MG/ML
1 INJECTION, SOLUTION INFILTRATION; PERINEURAL
Status: DISCONTINUED | OUTPATIENT
Start: 2024-04-15 | End: 2024-04-15 | Stop reason: HOSPADM

## 2024-04-15 RX ORDER — ACETAMINOPHEN 500 MG
1000 TABLET ORAL ONCE
Status: COMPLETED | OUTPATIENT
Start: 2024-04-15 | End: 2024-04-15

## 2024-04-15 RX ORDER — ONDANSETRON 2 MG/ML
INJECTION INTRAMUSCULAR; INTRAVENOUS PRN
Status: DISCONTINUED | OUTPATIENT
Start: 2024-04-15 | End: 2024-04-15 | Stop reason: SDUPTHER

## 2024-04-15 RX ORDER — OXYCODONE HYDROCHLORIDE 5 MG/1
10 TABLET ORAL PRN
Status: DISCONTINUED | OUTPATIENT
Start: 2024-04-15 | End: 2024-04-15 | Stop reason: HOSPADM

## 2024-04-15 RX ORDER — HYDROMORPHONE HYDROCHLORIDE 2 MG/ML
0.5 INJECTION, SOLUTION INTRAMUSCULAR; INTRAVENOUS; SUBCUTANEOUS EVERY 5 MIN PRN
Status: DISCONTINUED | OUTPATIENT
Start: 2024-04-15 | End: 2024-04-15 | Stop reason: HOSPADM

## 2024-04-15 RX ORDER — HYDROMORPHONE HYDROCHLORIDE 2 MG/ML
0.25 INJECTION, SOLUTION INTRAMUSCULAR; INTRAVENOUS; SUBCUTANEOUS EVERY 5 MIN PRN
Status: DISCONTINUED | OUTPATIENT
Start: 2024-04-15 | End: 2024-04-15 | Stop reason: HOSPADM

## 2024-04-15 RX ORDER — FENTANYL CITRATE 50 UG/ML
INJECTION, SOLUTION INTRAMUSCULAR; INTRAVENOUS PRN
Status: DISCONTINUED | OUTPATIENT
Start: 2024-04-15 | End: 2024-04-15 | Stop reason: SDUPTHER

## 2024-04-15 RX ORDER — ONDANSETRON 2 MG/ML
4 INJECTION INTRAMUSCULAR; INTRAVENOUS
Status: DISCONTINUED | OUTPATIENT
Start: 2024-04-15 | End: 2024-04-15 | Stop reason: HOSPADM

## 2024-04-15 RX ORDER — NEOSTIGMINE METHYLSULFATE 1 MG/ML
INJECTION, SOLUTION INTRAVENOUS PRN
Status: DISCONTINUED | OUTPATIENT
Start: 2024-04-15 | End: 2024-04-15 | Stop reason: SDUPTHER

## 2024-04-15 RX ADMIN — PROPOFOL 200 MG: 10 INJECTION, EMULSION INTRAVENOUS at 09:41

## 2024-04-15 RX ADMIN — FENTANYL CITRATE 100 MCG: 50 INJECTION, SOLUTION INTRAMUSCULAR; INTRAVENOUS at 09:41

## 2024-04-15 RX ADMIN — ONDANSETRON 4 MG: 2 INJECTION INTRAMUSCULAR; INTRAVENOUS at 10:10

## 2024-04-15 RX ADMIN — PHENYLEPHRINE HYDROCHLORIDE 100 MCG: 10 INJECTION INTRAVENOUS at 09:59

## 2024-04-15 RX ADMIN — Medication 2000 MG: at 09:54

## 2024-04-15 RX ADMIN — GLYCOPYRROLATE 1 MG: 0.2 INJECTION INTRAMUSCULAR; INTRAVENOUS at 10:22

## 2024-04-15 RX ADMIN — ACETAMINOPHEN 1000 MG: 500 TABLET, FILM COATED ORAL at 09:31

## 2024-04-15 RX ADMIN — SODIUM CHLORIDE, POTASSIUM CHLORIDE, SODIUM LACTATE AND CALCIUM CHLORIDE: 600; 310; 30; 20 INJECTION, SOLUTION INTRAVENOUS at 09:32

## 2024-04-15 RX ADMIN — ROCURONIUM BROMIDE 50 MG: 10 INJECTION, SOLUTION INTRAVENOUS at 09:41

## 2024-04-15 RX ADMIN — Medication 5 MG: at 10:22

## 2024-04-15 RX ADMIN — LIDOCAINE HYDROCHLORIDE 80 MG: 20 INJECTION, SOLUTION EPIDURAL; INFILTRATION; INTRACAUDAL; PERINEURAL at 09:40

## 2024-04-15 ASSESSMENT — PAIN - FUNCTIONAL ASSESSMENT: PAIN_FUNCTIONAL_ASSESSMENT: 0-10

## 2024-04-15 ASSESSMENT — COPD QUESTIONNAIRES: CAT_SEVERITY: MILD

## 2024-04-15 NOTE — ANESTHESIA POSTPROCEDURE EVALUATION
Department of Anesthesiology  Postprocedure Note    Patient: Arnoldo Haji Stone  MRN: [id number]  YOB: 1945  Date of evaluation: 4/15/2024    Procedure Summary       Date: 04/15/24 Room / Location: [facility] MAIN OR  [facility] / [facility] MAIN OR    Anesthesia Start: 0934 Anesthesia Stop: 1037    Procedure: CYSTOSCOPY, LEFT URETEROSCOPY, LASER LITHOTRIPSY, LEFT STENT PLACEMENT (Left: Ureter) Diagnosis:       Kidney stone      (Kidney stone [N20.0])    Providers: Artur Case MD Responsible Provider: MADELINE Pressley MD    Anesthesia Type: general ASA Status: 3            Anesthesia Type: No value filed.    Charanjit Phase I: Charanjit Score: 7    Charanjit Phase II: Charanjit Score: 10    Anesthesia Post Evaluation    Patient location during evaluation: PACU  Patient participation: complete - patient participated  Level of consciousness: awake and alert  Airway patency: patent  Nausea & Vomiting: no nausea and no vomiting  Cardiovascular status: hemodynamically stable  Respiratory status: acceptable  Hydration status: euvolemic  Comments: Blood pressure 138/67, pulse 88, temperature 97.8 °F (36.6 °C), temperature source Temporal, resp. rate 16, height 1.854 m (6' 1\"), weight 101.6 kg (224 lb), SpO2 95 %.    No apparent anesthetic complications.  Pt stable for discharge from PACU  Multimodal analgesia pain management approach  Pain management: adequate    No notable events documented.

## 2024-04-15 NOTE — DISCHARGE INSTRUCTIONS
Take 81mg Aspirin until stent is removed.     If you have had surgery in the past 7-10 days by one of our providers and are having fever, bleeding, or drainage from an incision, have an opening in an incision, or having issues urinating properly, please call [phone].    Ureteral Stent Placement: What to Expect at Home  Your Recovery  A ureteral (say \"you-REE-ter-ul\") stent is a thin, hollow tube that is placed in the ureter to help urine pass from the kidney into the bladder. Ureters are the tubes that connect the kidneys to the bladder.  You may have a small amount of blood in your urine for 1 to 3 days after the procedure.  While the stent is in place, you may have to urinate more often, feel a sudden need to urinate, or feel like you cannot completely empty your bladder. You may feel some pain when you urinate or do strenuous activity. You also may notice a small amount of blood in your urine after strenuous activities. These side effects usually do not prevent people from doing their normal daily activities. You may have a string attached to your stent. Do not to pull the string unless the doctor tells you to. The doctor will use the string to pull out the stent when you no longer need it.  After the procedure, urine may flow better from your kidneys to your bladder. A ureteral stent may be left in place for several days or for as long as several months. Your doctor will take it out when you no longer need it.    Cystoscopy: What to Expect at Home  Your Recovery  A cystoscopy is a procedure that lets a doctor look inside of the bladder and the urethra. The urethra is the tube that carries urine from the bladder to outside the body. The doctor uses a thin, lighted tube called a cystoscope to look for kidney or bladder stones, tumors, bleeding, or infection. After the cystoscopy, your urethra may be sore at first, and it may burn when you urinate for the first few days after the procedure. You may feel the

## 2024-04-15 NOTE — INTERVAL H&P NOTE
Update History & Physical    The patient's History and Physical of April 3, 2024 was reviewed with the patient and I examined the patient. There was no change. The surgical site was confirmed by the patient and me.     Plan: The risks, benefits, expected outcome, and alternative to the recommended procedure have been discussed with the patient. Patient understands and wants to proceed with the procedure.     Electronically signed by EVI ORO MD on 4/15/2024 at 9:12 AM

## 2024-04-15 NOTE — OP NOTE
[address]  [address]                            OPERATIVE REPORT      PATIENT NAME: LORELEI ROSS            : 1945  MED REC NO: [id number]                       ROOM: [facility] NO: [id number]                       ADMIT DATE: 04/15/2024  PROVIDER: Artur Case MD    DATE OF SERVICE:  04/15/2024    PREOPERATIVE DIAGNOSES:  6 mm left renal pelvis calculus.    POSTOPERATIVE DIAGNOSES:  6 mm left renal pelvis calculus.    PROCEDURES PERFORMED:  Today:  Cystourethroscopy, left ureteroscopy, laser lithotripsy, and left ureteral stent placement.    SURGEON:  Artur Case MD    ASSISTANT:  None.    ANESTHESIA:  General.    ESTIMATED BLOOD LOSS:  Minimal.    SPECIMENS REMOVED:  None.    INTRAOPERATIVE FINDINGS:  I was able to reach the stone and fragment it into extremely small fragments and then moved the calyces, fragmenting the smaller stone to small as I could as well.     COMPLICATIONS:  None apparent.    IMPLANTS:  Left double-J ureteral stent.    INDICATIONS:  This is a 78-year-old gentleman with intermittent left flank pain, who had been found to have a left renal pelvis calculus as well as other smaller left calyceal stones, and after an unsuccessful shockwave lithotripsy that was performed on 2024, he decided to move forward with the above-named procedure.    DESCRIPTION OF PROCEDURE:  The patient was taken to the operating room and placed in supine position.  Anesthesia was induced via the Anesthesiology Service.  He was repositioned in the lithotomy position, and prepped and draped in a sterile surgical fashion with genitalia in a sterile field.  A 22-Guamanian rigid cystoscope was introduced atraumatically via the urethra.  He had an enlarged prostate with 3-3.5 cm long latera lobes that kissed in the midline and high bladder neck.  Once within the bladder, he had mild trabeculations.  I turned my

## 2024-04-15 NOTE — ANESTHESIA PRE PROCEDURE
AST 25 05/28/2019 08:09 AM    ALT 43 05/28/2019 08:09 AM       POC Tests: No results for input(s): \"POCGLU\", \"POCNA\", \"POCK\", \"POCCL\", \"POCBUN\", \"POCHEMO\", \"POCHCT\" in the last 72 hours.    Coags: No results found for: \"PROTIME\", \"INR\", \"APTT\"    HCG (If Applicable): No results found for: \"PREGTESTUR\", \"PREGSERUM\", \"HCG\", \"HCGQUANT\"     ABGs: No results found for: \"PHART\", \"PO2ART\", \"UAZ0QWH\", \"SRH6YPG\", \"BEART\", \"[id number]\"     Type & Screen (If Applicable):  No results found for: \"LABABO\", \"LABRH\"    Drug/Infectious Status (If Applicable):  No results found for: \"HIV\", \"HEPCAB\"    COVID-19 Screening (If Applicable): No results found for: \"COVID19\"        Anesthesia Evaluation  Patient summary reviewed and Nursing notes reviewed   no history of anesthetic complications:   Airway: Mallampati: II  TM distance: >3 FB   Neck ROM: full  Mouth opening: > = 3 FB   Dental:    (+) upper dentures and lower dentures      Pulmonary: breath sounds clear to auscultation  (+)  COPD (Pt has MDI but never uses): mild,    sleep apnea: on CPAP,                                  Cardiovascular:  Exercise tolerance: good (>4 METS)  (+) hypertension:, hyperlipidemia        Rhythm: regular  Rate: normal                 ROS comment: Echo 2020 - normal EF, no sig valve dz     Neuro/Psych:   (+) CVA (Pt reports diagnosed on CT scan but never had sx's. ASA reduced to bASA for surgery.):            GI/Hepatic/Renal:   (+) renal disease: kidney stones          Endo/Other:    (+) DiabetesType II DM, well controlled.                 Abdominal:             Vascular: negative vascular ROS.         Other Findings:       Anesthesia Plan      general     ASA 3       Induction: intravenous.    MIPS: Postoperative opioids intended and Prophylactic antiemetics administered.  Anesthetic plan and risks discussed with patient.                    MADELINE DUMONT MD   4/15/2024

## 2024-04-15 NOTE — BRIEF OP NOTE
Brief Postoperative Note      Patient: Arnoldo Haji Stone  YOB: 1945  MRN: [id number]    Date of Procedure: 4/15/2024    Pre-Op Diagnosis Codes:     * Kidney stone [N20.0]    Post-Op Diagnosis: Same       Procedure(s):  CYSTOSCOPY, LEFT URETEROSCOPY, LASER LITHOTRIPSY, LEFT STENT PLACEMENT    Surgeon(s):  Artur Case MD    Assistant:  * No surgical staff found *    Anesthesia: General    Estimated Blood Loss (mL): Minimal    Complications: None    Specimens:   * No specimens in log *    Implants:  Implant Name Type Inv. Item Serial No.  Lot No. LRB No. Used Action   STENT URET 6FR L24CM HYDR+ GRAD CIRCUMFERENTIAL MRK LO PROF - [id number]  STENT URET 6FR L24CM HYDR+ GRAD CIRCUMFERENTIAL MRK LO PROF  [facility] UROLOGY-WD [id number] Left 1 Implanted         Drains: * No LDAs found *    Findings:  Other Findings: see dictation    Electronically signed by ARTUR CASE MD on 4/15/2024 at 10:26 AM

## 2024-04-18 ENCOUNTER — OFFICE VISIT (OUTPATIENT)
Dept: UROLOGY | Age: 79
End: 2024-04-18
Payer: MEDICARE

## 2024-04-18 DIAGNOSIS — R30.0 DYSURIA: ICD-10-CM

## 2024-04-18 DIAGNOSIS — N20.0 KIDNEY STONE: Primary | ICD-10-CM

## 2024-04-18 LAB
BILIRUBIN, URINE, POC: NORMAL
BLOOD URINE, POC: NORMAL
GLUCOSE URINE, POC: NEGATIVE
KETONES, URINE, POC: NORMAL
LEUKOCYTE ESTERASE, URINE, POC: NORMAL
NITRITE, URINE, POC: NEGATIVE
PH, URINE, POC: 6.5 (ref 4.6–8)
PROTEIN,URINE, POC: 300
SPECIFIC GRAVITY, URINE, POC: 1.02 (ref 1–1.03)
URINALYSIS CLARITY, POC: NORMAL
URINALYSIS COLOR, POC: NORMAL
UROBILINOGEN, POC: NORMAL

## 2024-04-18 PROCEDURE — 74018 RADEX ABDOMEN 1 VIEW: CPT | Performed by: NURSE PRACTITIONER

## 2024-04-18 PROCEDURE — 81003 URINALYSIS AUTO W/O SCOPE: CPT | Performed by: NURSE PRACTITIONER

## 2024-04-21 LAB
BACTERIA SPEC CULT: NORMAL
SERVICE CMNT-IMP: NORMAL

## 2024-07-18 ENCOUNTER — OFFICE VISIT (OUTPATIENT)
Dept: UROLOGY | Age: 79
End: 2024-07-18
Payer: MEDICARE

## 2024-07-18 DIAGNOSIS — R35.0 URINARY FREQUENCY: ICD-10-CM

## 2024-07-18 DIAGNOSIS — N20.0 KIDNEY STONE: Primary | ICD-10-CM

## 2024-07-18 LAB
BILIRUBIN, URINE, POC: NEGATIVE
BLOOD URINE, POC: NEGATIVE
GLUCOSE URINE, POC: NEGATIVE
KETONES, URINE, POC: NEGATIVE
LEUKOCYTE ESTERASE, URINE, POC: NEGATIVE
NITRITE, URINE, POC: NEGATIVE
PH, URINE, POC: 5.5 (ref 4.6–8)
PROTEIN,URINE, POC: 30
SPECIFIC GRAVITY, URINE, POC: 1.01 (ref 1–1.03)
URINALYSIS CLARITY, POC: NORMAL
URINALYSIS COLOR, POC: NORMAL
UROBILINOGEN, POC: NORMAL

## 2024-07-18 PROCEDURE — 99214 OFFICE O/P EST MOD 30 MIN: CPT | Performed by: NURSE PRACTITIONER

## 2024-07-18 PROCEDURE — 81003 URINALYSIS AUTO W/O SCOPE: CPT | Performed by: NURSE PRACTITIONER

## 2024-07-18 PROCEDURE — 1123F ACP DISCUSS/DSCN MKR DOCD: CPT | Performed by: NURSE PRACTITIONER

## 2024-07-18 RX ORDER — OXYBUTYNIN CHLORIDE 5 MG/1
5 TABLET, EXTENDED RELEASE ORAL DAILY
Qty: 90 TABLET | Refills: 3 | Status: SHIPPED | OUTPATIENT
Start: 2024-07-18

## 2024-07-18 ASSESSMENT — ENCOUNTER SYMPTOMS: BACK PAIN: 0

## 2024-07-18 NOTE — PROGRESS NOTES
No residual weakness.       Past Surgical History:   Procedure Laterality Date    APPENDECTOMY      COLONOSCOPY N/A 9/25/2018    COLONOSCOPY  BMI 34 performed by Toñito Serrano MD at [facility] ENDOSCOPY    COLONOSCOPY      numerous (x 5-6)    CORNEAL TRANSPLANT Right     Bilateral at different time    CYSTOSCOPY Left 4/15/2024    CYSTOSCOPY, LEFT URETEROSCOPY, LASER LITHOTRIPSY, LEFT STENT PLACEMENT performed by Artur Case MD at [facility] MAIN OR    LITHOTRIPSY Left 3/12/2024    EXTRACORPOREAL SHOCK WAVE LITHOTRIPSY / LEFT performed by Ruddy Otto Jr., MD at [facility] OPC     Current Outpatient Medications   Medication Sig Dispense Refill    oxyBUTYnin (DITROPAN XL) 5 MG extended release tablet Take 1 tablet by mouth daily 90 tablet 3    Omega-3 Fatty Acids (FISH OIL) 1000 MG CPDR Take 3 capsules by mouth 2 times daily      budesonide-formoterol (SYMBICORT) 160-4.5 MCG/ACT AERO daily as needed      melatonin 10 MG CAPS capsule Take 1 capsule by mouth nightly      Cholecalciferol 50 MCG (2000 UT) TABS Take 1 tablet by mouth daily      irbesartan (AVAPRO) 300 MG tablet Take 0.5 tablets by mouth in the morning and at bedtime      metFORMIN (GLUCOPHAGE) 1000 MG tablet Take 1 tablet by mouth daily (with breakfast)      pioglitazone (ACTOS) 15 MG tablet Take 1 tablet by mouth daily      rosuvastatin (CRESTOR) 10 MG tablet Take 1 tablet by mouth nightly       No current facility-administered medications for this visit.     No Known Allergies  Social History     Socioeconomic History    Marital status:      Spouse name: Not on file    Number of children: Not on file    Years of education: Not on file    Highest education level: Not on file   Occupational History    Not on file   Tobacco Use    Smoking status: Every Day     Current packs/day: 1.00     Types: Cigarettes    Smokeless tobacco: Never   Substance and Sexual Activity    Alcohol use: Yes     Comment: occ    Drug use: No    Sexual activity: Not on file   Other

## 2024-09-10 NOTE — PERIOP NOTE
Problem: Potential for Falls  Goal: Patient will remain free of falls  Description: INTERVENTIONS:  - Educate patient/family on patient safety including physical limitations  - Instruct patient to call for assistance with activity   - Consult OT/PT to assist with strengthening/mobility   - Keep Call bell within reach  - Keep bed low and locked with side rails adjusted as appropriate  - Keep care items and personal belongings within reach  - Initiate and maintain comfort rounds  - Make Fall Risk Sign visible to staff  - Offer Toileting every 2 Hours, in advance of need  - Initiate/Maintain bed alarm  - Obtain necessary fall risk management equipment: yellow socks  - Apply yellow socks and bracelet for high fall risk patients  - Consider moving patient to room near nurses station  Outcome: Progressing     Problem: PAIN - ADULT  Goal: Verbalizes/displays adequate comfort level or baseline comfort level  Description: Interventions:  - Encourage patient to monitor pain and request assistance  - Assess pain using appropriate pain scale  - Administer analgesics based on type and severity of pain and evaluate response  - Implement non-pharmacological measures as appropriate and evaluate response  - Consider cultural and social influences on pain and pain management  - Notify physician/advanced practitioner if interventions unsuccessful or patient reports new pain  Outcome: Progressing     Problem: INFECTION - ADULT  Goal: Absence or prevention of progression during hospitalization  Description: INTERVENTIONS:  - Assess and monitor for signs and symptoms of infection  - Monitor lab/diagnostic results  - Monitor all insertion sites, i.e. indwelling lines, tubes, and drains  - Monitor endotracheal if appropriate and nasal secretions for changes in amount and color  - [place] appropriate cooling/warming therapies per order  - Administer medications as ordered  - Instruct and encourage patient and family to use good hand  Preop department called to notify patient of arrival time for scheduled procedure. Instructions given to   - Arrive at [facility] [address]  - Remain NPO after midnight, unless otherwise indicated, including gum, mints, and ice chips.   - Have a responsible adult to drive patient to the hospital, stay during surgery, and patient will need supervision 24 hours after anesthesia.   - Use antibacterial soap in shower the night before surgery and on the morning of surgery.       Was patient contacted: pt  Voicemail left:   Numbers contacted: [phone]   Arrival time: 1045      hygiene technique  - Identify and instruct in appropriate isolation precautions for identified infection/condition  Outcome: Progressing  Goal: Absence of fever/infection during neutropenic period  Description: INTERVENTIONS:  - Monitor WBC    Outcome: Progressing     Problem: SAFETY ADULT  Goal: Patient will remain free of falls  Description: INTERVENTIONS:  - Educate patient/family on patient safety including physical limitations  - Instruct patient to call for assistance with activity   - Consult OT/PT to assist with strengthening/mobility   - Keep Call bell within reach  - Keep bed low and locked with side rails adjusted as appropriate  - Keep care items and personal belongings within reach  - Initiate and maintain comfort rounds  - Make Fall Risk Sign visible to staff  - Offer Toileting every 2 Hours, in advance of need  - Initiate/Maintain bed alarm  - Obtain necessary fall risk management equipment: yellow socks  - Apply yellow socks and bracelet for high fall risk patients  - Consider moving patient to room near nurses station  Outcome: Progressing  Goal: Maintain or return to baseline ADL function  Description: INTERVENTIONS:  - Educate patient/family on patient safety including physical limitations  - Instruct patient to call for assistance with activity   - Consult OT/PT to assist with strengthening/mobility   - Keep Call bell within reach  - Keep bed low and locked with side rails adjusted as appropriate  - Keep care items and personal belongings within reach  - Initiate and maintain comfort rounds  - Make Fall Risk Sign visible to staff  - Offer Toileting every 2 Hours, in advance of need  - Initiate/Maintain bed alarm  - Obtain necessary fall risk management equipment: yellow socks  - Apply yellow socks and bracelet for high fall risk patients  - Consider moving patient to room near nurses station  Outcome: Progressing  Goal: Maintains/Returns to pre admission functional level  Description:  INTERVENTIONS:  - Perform AM-PAC 6 Click Basic Mobility/ Daily Activity assessment daily.  - Set and communicate daily mobility goal to care team and patient/family/caregiver.   - Collaborate with rehabilitation services on mobility goals if consulted  - Perform Range of Motion 3 times a day.  - Reposition patient every 2 hours.  - Dangle patient 3 times a day  - Stand patient 3 times a day  - Ambulate patient 3 times a day  - Out of bed to chair 3 times a day   - Out of bed for meals 3 times a day  - Out of bed for toileting  - Record patient progress and toleration of activity level   Outcome: Progressing     Problem: DISCHARGE PLANNING  Goal: Discharge to home or other facility with appropriate resources  Description: INTERVENTIONS:  - Identify barriers to discharge w/patient and caregiver  - Arrange for needed discharge resources and transportation as appropriate  - Identify discharge learning needs (meds, wound care, etc.)  - Arrange for interpretive services to assist at discharge as needed  - Refer to Case Management Department for coordinating discharge planning if the patient needs post-hospital services based on physician/advanced practitioner order or complex needs related to functional status, cognitive ability, or social support system  Outcome: Progressing     Problem: Knowledge Deficit  Goal: Patient/family/caregiver demonstrates understanding of disease process, treatment plan, medications, and discharge instructions  Description: Complete learning assessment and assess knowledge base.  Interventions:  - Provide teaching at level of understanding  - Provide teaching via preferred learning methods  Outcome: Progressing

## 2024-10-21 ENCOUNTER — HOSPITAL ENCOUNTER (OUTPATIENT)
Dept: CT IMAGING | Age: 79
Discharge: HOME | End: 2024-10-24
Attending: PSYCHIATRY & NEUROLOGY
Payer: MEDICARE

## 2024-10-21 DIAGNOSIS — I77.810 DILATION OF THORACIC AORTA (HCC): ICD-10-CM

## 2024-10-21 DIAGNOSIS — I63.9 CEREBROVASCULAR ACCIDENT (CVA), UNSPECIFIED MECHANISM (HCC): ICD-10-CM

## 2024-10-21 LAB — CREAT BLD-MCNC: 0.9 MG/DL (ref 0.8–1.5)

## 2024-10-21 PROCEDURE — 70496 CT ANGIOGRAPHY HEAD: CPT | Performed by: RADIOLOGY

## 2024-10-21 PROCEDURE — 70496 CT ANGIOGRAPHY HEAD: CPT

## 2024-10-21 PROCEDURE — 70498 CT ANGIOGRAPHY NECK: CPT | Performed by: RADIOLOGY

## 2024-10-21 PROCEDURE — 6360000004 HC RX CONTRAST MEDICATION: Performed by: PSYCHIATRY & NEUROLOGY

## 2024-10-21 PROCEDURE — 82565 ASSAY OF CREATININE: CPT

## 2024-10-21 RX ORDER — IOPAMIDOL 755 MG/ML
50 INJECTION, SOLUTION INTRAVASCULAR
Status: COMPLETED | OUTPATIENT
Start: 2024-10-21 | End: 2024-10-21

## 2024-10-21 RX ADMIN — IOPAMIDOL 50 ML: 755 INJECTION, SOLUTION INTRAVENOUS at 13:00

## 2025-05-28 ENCOUNTER — TELEPHONE (OUTPATIENT)
Dept: UROLOGY | Age: 80
End: 2025-05-28

## 2025-05-28 NOTE — TELEPHONE ENCOUNTER
[facility]  Great Day!  Anitra will not be in the office JULY 16 @ 1:15 pm. I have to reschedule your appointment for JULY 16 @ 1145pm.

## 2025-07-16 ENCOUNTER — OFFICE VISIT (OUTPATIENT)
Dept: UROLOGY | Age: 80
End: 2025-07-16
Payer: MEDICARE

## 2025-07-16 DIAGNOSIS — N20.0 KIDNEY STONE: Primary | ICD-10-CM

## 2025-07-16 DIAGNOSIS — R35.0 URINARY FREQUENCY: ICD-10-CM

## 2025-07-16 LAB
BILIRUBIN, URINE, POC: NEGATIVE
BLOOD URINE, POC: NEGATIVE
GLUCOSE URINE, POC: NEGATIVE MG/DL
KETONES, URINE, POC: NEGATIVE MG/DL
LEUKOCYTE ESTERASE, URINE, POC: NEGATIVE
NITRITE, URINE, POC: NEGATIVE
PH, URINE, POC: 5.5 (ref 4.6–8)
PROTEIN,URINE, POC: NEGATIVE MG/DL
SPECIFIC GRAVITY, URINE, POC: 1.02 (ref 1–1.03)
URINALYSIS CLARITY, POC: NORMAL
URINALYSIS COLOR, POC: NORMAL
UROBILINOGEN, POC: NORMAL MG/DL

## 2025-07-16 PROCEDURE — 1159F MED LIST DOCD IN RCRD: CPT | Performed by: NURSE PRACTITIONER

## 2025-07-16 PROCEDURE — 1160F RVW MEDS BY RX/DR IN RCRD: CPT | Performed by: NURSE PRACTITIONER

## 2025-07-16 PROCEDURE — 4004F PT TOBACCO SCREEN RCVD TLK: CPT | Performed by: NURSE PRACTITIONER

## 2025-07-16 PROCEDURE — 81003 URINALYSIS AUTO W/O SCOPE: CPT | Performed by: NURSE PRACTITIONER

## 2025-07-16 PROCEDURE — G8427 DOCREV CUR MEDS BY ELIG CLIN: HCPCS | Performed by: NURSE PRACTITIONER

## 2025-07-16 PROCEDURE — G8421 BMI NOT CALCULATED: HCPCS | Performed by: NURSE PRACTITIONER

## 2025-07-16 PROCEDURE — 99214 OFFICE O/P EST MOD 30 MIN: CPT | Performed by: NURSE PRACTITIONER

## 2025-07-16 PROCEDURE — 1123F ACP DISCUSS/DSCN MKR DOCD: CPT | Performed by: NURSE PRACTITIONER

## 2025-07-16 ASSESSMENT — ENCOUNTER SYMPTOMS: BACK PAIN: 0

## 2025-07-16 NOTE — PROGRESS NOTES
tobacco: Never   Substance and Sexual Activity    Alcohol use: Yes     Comment: occ    Drug use: No    Sexual activity: Not on file   Other Topics Concern    Not on file   Social History Narrative    Not on file     Social Drivers of Health     Financial Resource Strain: Not on file   Food Insecurity: Not on file   Transportation Needs: Not on file   Physical Activity: Not on file   Stress: Not on file   Social Connections: Unknown (3/19/2021)    Received from [facility]    Social Connections     Frequency of Communication with Friends and Family: Not asked     Frequency of Social Gatherings with Friends and Family: Not asked   Intimate Partner Violence: Unknown (3/19/2021)    Received from [facility]    Intimate Partner Violence     Fear of Current or Ex-Partner: Not asked     Emotionally Abused: Not asked     Physically Abused: Not asked     Sexually Abused: Not asked   Housing Stability: Not At Risk (3/9/2022)    Received from [facility]    Housing Stability     Was there a time when you did not have a steady place to sleep: Unrecognized value     Worried that the place you are staying is making you sick: Unrecognized value     Family History   Problem Relation Age of Onset    Stroke Brother     Hypertension Father     Heart Disease Father     Coronary Art Dis Neg Hx        Review of Systems  Constitutional:   Negative for fever.  Genitourinary:  Negative for hematuria.  Musculoskeletal:  Negative for back pain.      Urinalysis  UA - Dipstick  Results for orders placed or performed in visit on 07/16/25   AMB POC URINALYSIS DIP STICK AUTO W/O MICRO    Collection Time: 07/16/25 11:43 AM   Result Value Ref Range    Color (UA POC)      Clarity (UA POC)      Glucose, Urine, POC Negative Negative mg/dL    Bilirubin, Urine, POC Negative Negative    KETONES, Urine, POC Negative Negative mg/dL    Specific Gravity, Urine, POC 1.020 1.001 - 1.035    Blood (UA POC)

## (undated) DEVICE — SYR 5ML 1/5 GRAD LL NSAF LF --

## (undated) DEVICE — CONTAINER PREFIL FRMLN 40ML --

## (undated) DEVICE — GUIDEWIRE UROLOGICAL STR 3 CM 0.038 INX150 CM DUAL-FLEX

## (undated) DEVICE — SINGLE-USE DIGITAL FLEXIBLE URETEROSCOPE: Brand: LITHOVUE

## (undated) DEVICE — FLEXIVA PULSE AND FLEXIVA PULSE TRACTIP LASER FIBERS ARE HIGH POWER SINGLE-USE: Brand: FLEXIVA PULSE

## (undated) DEVICE — BLOCK BITE AD 60FR W/ VELC STRP ADDRESSES MOST PT AND

## (undated) DEVICE — SOLUTION IRRIG 3000ML H2O STRL BAG

## (undated) DEVICE — CONNECTOR TBNG OD5-7MM O2 END DISP

## (undated) DEVICE — GOWN,REINFORCED,POLY,AURORA,XXLARGE,STR: Brand: MEDLINE

## (undated) DEVICE — GARMENT,MEDLINE,DVT,INT,CALF,MED, GEN2: Brand: MEDLINE

## (undated) DEVICE — PACK SURGICAL PROCEDURE KIT CYSTOSCOPY TOTE

## (undated) DEVICE — SYR 3ML LL TIP 1/10ML GRAD --

## (undated) DEVICE — SOLUTION IRRIG 1000ML STRL H2O USP PLAS POUR BTL

## (undated) DEVICE — NDL PRT INJ NSAF BLNT 18GX1.5 --

## (undated) DEVICE — KENDALL RADIOLUCENT FOAM MONITORING ELECTRODE RECTANGULAR SHAPE: Brand: KENDALL

## (undated) DEVICE — FORCEPS BX L240CM JAW DIA2.8MM L CAP W/ NDL MIC MESH TOOTH

## (undated) DEVICE — GLOVE ORANGE PI 8 1/2   MSG9085

## (undated) DEVICE — CANNULA NSL ORAL AD FOR CAPNOFLEX CO2 O2 AIRLFE

## (undated) DEVICE — DILATOR/SHEATH SET: Brand: 8/10 DILATOR/SHEATH SET